# Patient Record
Sex: MALE | Race: WHITE | NOT HISPANIC OR LATINO | Employment: OTHER | URBAN - NONMETROPOLITAN AREA
[De-identification: names, ages, dates, MRNs, and addresses within clinical notes are randomized per-mention and may not be internally consistent; named-entity substitution may affect disease eponyms.]

---

## 2018-03-14 ENCOUNTER — HOSPITAL ENCOUNTER (OUTPATIENT)
Dept: MRI IMAGING | Facility: HOSPITAL | Age: 68
Discharge: HOME | End: 2018-03-14
Attending: FAMILY MEDICINE | Admitting: FAMILY MEDICINE
Payer: MEDICARE

## 2018-03-14 DIAGNOSIS — M25.462 SWELLING OF LEFT KNEE JOINT: ICD-10-CM

## 2018-03-14 PROCEDURE — 73721 MRI JNT OF LWR EXTRE W/O DYE: CPT | Mod: TC,LT

## 2020-05-05 ENCOUNTER — HOSPITAL ENCOUNTER (OUTPATIENT)
Dept: PHYSICAL THERAPY | Facility: HOSPITAL | Age: 70
Setting detail: THERAPIES SERIES
End: 2020-05-05
Attending: SPECIALIST
Payer: MEDICARE

## 2020-05-05 PROCEDURE — 97162 PT EVAL MOD COMPLEX 30 MIN: CPT | Mod: GP

## 2020-05-05 PROCEDURE — 97110 THERAPEUTIC EXERCISES: CPT | Mod: GP
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
## 2020-11-06 NOTE — PROGRESS NOTES
Physical Therapy Discharge Note      Name: Wendy Rhodes MRN# [id number]   Age: 70 year old YOB: 1950        Requesting provider: Dr. Kang ref. provider found  Diagnosis: Back strain/sprain  Prescription: Evaluate and treat  Frequency/duration: Therapists discretion  Services provided: The patient was seen for a total of 1 visit on 5/5/20.  See the previous documentation for treatment details.        Plan  Intervention:  The patient has not contacted the department since the previous visit.  For additional information regarding goals and status as of last, please refer to prior documentation.  It is uncertain if the patient has attained any further goals at this time.  The patient will be formally discharged from physical therapy care.  We will resume physical therapy services as per physician referral and discretion.     Follow up:  Recheck with physician as needed

## 2021-02-26 ENCOUNTER — HOSPITAL ENCOUNTER (OUTPATIENT)
Dept: PHYSICAL THERAPY | Facility: HOSPITAL | Age: 71
Setting detail: THERAPIES SERIES
End: 2021-02-26
Attending: FAMILY MEDICINE
Payer: MEDICARE

## 2021-02-26 PROCEDURE — 97161 PT EVAL LOW COMPLEX 20 MIN: CPT | Mod: GP

## 2021-02-26 PROCEDURE — 97012 MECHANICAL TRACTION THERAPY: CPT | Mod: GP

## 2021-02-26 NOTE — PROGRESS NOTES
Initial Physical Therapy Evaluations       Name: Wendy Rhodes MRN# [id number]   Age: 70 year old YOB: 1950     Date of Consultation: February 26, 2021  Primary care provider: Naren Barry    Referring Physician: Dr. Riggins  Orders: Eval and Treat  Medical Diagnosis: Cervical Stenosis  Onset of Illness/Injury: Spring/Summer 2020    Reason for PT Visit: Patient is a 69 y/o Male who presents with cervical complaints today. He reports numbness in his neck left arm and right hand that began in the summer of 2020. He has numbness in fingers 3-5 in his right hand, he has numbness in all of his left forearm and left hand. He also has numbness in the right side of the neck. He reports that these are exacerbated by sustained cervical extension. At first he thought this was due to his bilateral carpal tunnel which he had surgery on before. He has seen a spine surgeon who recommended a cervical fusion, he has a history of lumbar fusion. His back has not been bothering him since then. He does a lot of heavy outdoor work like cutting wood, moving his boat lift, moving his pontoon. 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 He tries to avoid overhead work when possible.   Prior Level of Function: Patient had some numbness and weakness in his hands due to bilateral carpal tunnel compression.   Pain: 0-1/10  Stiffness    Community Support/Living Environment/Employment History: Patient lives in a home with his wife and has no difficulty getting around the home.     Patient/Family Goal: He would like to minimize the numbness and tingling in his bilateral upper extremities until he is done with school and can have surgery.    Fall Screen:   Have you fallen 2 or more times in the last year? No  Have you fallen and had an injury in the last year? No  Is patient a fall risk? No    Past Medical History:   Past Medical History:   Diagnosis Date     Hypertension        Past Surgical History:  L4-L5 fusion in  2014.  Medications:   Current Outpatient Medications   Medication Sig     ATENOLOL PO Take 50 mg by mouth 2 times daily     LISINOPRIL PO Take 5 mg by mouth daily     No current facility-administered medications for this encounter.        Imaging: No recent c-spine imaging in patient's chart.    Musculoskeletal Findings:     OBJECTIVE   Observation: Patient presents to department in no acute distress.     Palpation: Patient cervical spine is diffusely tender to palpation.    Posture: Forward head, protracted shoulders   Sitting Posture: Forward head, patient sits with a flexed head when possible.   Standing Posture: Forward head, protracted shoulders and flexed head when possible.    Neurological Assessment:   Reflexes:   Biceps - 2+ bilaterally  Triceps - Absent bilaterally, patient struggled to relax his upper extremities    Myotomes:   Right upper extremity: within functional limits and symmetrical   Left upper extremity: within functional limits and symmetrical     Dermatomes:   Right upper extremity: within functional limits and symmetrical except for C7 which had reduced sensation  Left upper extremity: within functional limits and symmetrical except for C7 which had reduced sensation    Range of Motion/Strength:   Cervical Spine Range of Motion   Active Motion   Flexion 32    Extension 46    Side Bend Right 35    Side Bend Left 33    Rotation Right 70    Rotation Left 60    Right upper extremity range of motion: WNL   Left upper extremity range of motion: WNL     Right upper extremity strength: WNL except lower trapezius 4/5 and middle trapezius 4+/5  Left upper extremity strength: WNL except lower trapezius 4/5 and middle trapezius 4+/5  Deep Neck Flexor Endurance Test: 13 seconds prior to substitution    Special Tests:   Cervical Spine Tests  Spurling: R - negative L -  negative  Passive Intervertebral Movement Testing: Hypomobility throughout the cervical spine with central PA pressures  Median Nerve  Tension Test: Negative bilaterally  Radial Nerve Tension Test: Negative bilaterally  Ulnar Nerve Tension Test: Negative bilaterally       Outcome Measures:   Neck Disability Index - 18%    Prognosis/Plan of Care: Guarded, secondary to the patient's openness to physical therapy treatment for his condition.  Appropriate for Physical Therapy Intervention: Yes     GOALS:   Short-term goals:  To be achieved in 3-4 weeks:    Instruct in home program  1.) Patient will be independent with a short-term home exercise program.  2.) Patient will understand and demonstrate improved posture and techniques such that patient places less strain over cervical spine.  3.) Patient will report a 25% or greater improvement in symptoms in order to allow for increased comfort with activities of daily living.       Long-term goals:  To be achieved in 8-12 weeks:    Self management of symptoms.  Pain free activities of daily living.  1.) Improve score on Neck Disability Index by 50% to correlate with clinically significant change.  2.) Patient will report a 50% or greater improvement in symptoms in order to allow for increased comfort with activities of daily living.  3.) Patient will be able to be on the computer for extended periods of time, 30+ minutes, without exacerbating his upper extremity numbness.        Discharge goals: Patient will be independent with a home program for self-management of symptoms.      Treatment plan:  1.) Modalities including ultrasound, electrical stimulation, and mechanical traction as needed for pain management and increasing tissue extensibility  2.) Manual therapy to include cervical and thoracic spine joint and soft tissue mobilization for improved mobility and decreased pain  3.) Therapeutic exercise to consist of cervical stabilization and scapular strengthening and range of motion activities    Treatment Rendered/Intervention:   Evaluation completed as described above followed by discussion of exam  findings and plan of care.    Mechanical Traction: Mechanical intermittent cervical traction with max pull of 20 lbs for 45 seconds and min pull of 12 lbs for 15 seconds.      Clinical Impressions:  Criteria for Skilled Therapeutic Intervention Met: Yes  PT Diagnosis: Cervical stenosis  Influenced by the following impairments: Numbness and tingling in the bilateral upper extremities exacerbated by neck extension and improved by neck flexion  Functional limitations due to impairment: Patient is unable to maintain extended neck postures without exacerbating his UE numbness  Clinical presentation: Stable/Uncomplicated  Clinical presentation rationale: Patient's symptoms have not rapidly evolved since onset.  Clinical Decision making (complexity): Low Complexity  Predicted Duration of Therapy Intervention (days/wks): 1x/week for up to 12 weeks  Risks and Benefits of therapy have been explained: Yes  Patient, Family & other staff in agreement with plan of care: Yes  Comments: Patient presents today with signs and symptoms consistent of cervical stenosis. He presents with upper extremity numbness and tingling bilaterally that is exacerbated by neck extension and improved with neck flexion. Therapy today consisted of evaluation, patient education, and therapeutic exercise. Patient would benefit from continued PT sessions addressing overall pain and dysfunction with use of appropriate interventions. Treatment today consisted of cervical traction. Future sessions will include therapeutic exercise aimed at optimizing neck posture for decreased compression on neural tissues. Patient's response to treatment will be monitored and adjusted accordingly.      Total Evaluation Time: 40 minutes

## 2021-03-12 ENCOUNTER — HOSPITAL ENCOUNTER (OUTPATIENT)
Dept: PHYSICAL THERAPY | Facility: HOSPITAL | Age: 71
Setting detail: THERAPIES SERIES
End: 2021-03-12
Attending: FAMILY MEDICINE
Payer: MEDICARE

## 2021-03-12 PROCEDURE — 97012 MECHANICAL TRACTION THERAPY: CPT | Mod: GP

## 2021-03-12 NOTE — PROGRESS NOTES
Outpatient Physical Therapy Discharge Note     Patient: Wendy Rhodes  : 1950    Beginning/End Dates of Reporting Period:  2021 to 3/12/2021    Referring Provider: Dr. Riggins    Therapy Diagnosis: Cervical stenosis     Client Self Report: Patient arrives to PT today for neck rehab. He reports that he doesn't feel that traction helped much after his first appointment, he feels that everything is staying pretty consistent. He has numbness in his left hand and arm today. He reports that he's open to trying cervical traction again.    Goals:  Short-term goals:  To be achieved in 3-4 weeks:     Instruct in home program  1.) Patient will be independent with a short-term home exercise program. MET  2.) Patient will understand and demonstrate improved posture and techniques such that patient places less strain over cervical spine. MET  3.) Patient will report a 25% or greater improvement in symptoms in order to allow for increased comfort with activities of daily living. NOT MET        Long-term goals:  To be achieved in 8-12 weeks:     Self management of symptoms.  Pain free activities of daily living.  1.) Improve score on Neck Disability Index by 50% to correlate with clinically significant change. NOT MET  2.) Patient will report a 50% or greater improvement in symptoms in order to allow for increased comfort with activities of daily living. NOT MET  3.) Patient will be able to be on the computer for extended periods of time, 30+ minutes, without exacerbating his upper extremity numbness. NOT MET    Assessment:   Patient has failed to make progress with regards to his neck and radicular complaints. He has not experienced any change from his initial physical therapy visit. Patient did not experience any benefit from mechanical traction and actually experienced increase neck soreness, because of this lack of benefit he was not administered a home traction unit. Patient will discontinue physical therapy as  his condition did not respond to physical therapy management.    Plan:  Discharge from therapy.    Discharge:    Reason for Discharge: Patient chooses to discontinue therapy.  Patient has failed to make progress with conservative intervention.    Equipment Issued: None.    Discharge Plan: Patient to continue home program.

## 2021-06-13 ENCOUNTER — HOSPITAL ENCOUNTER (EMERGENCY)
Facility: HOSPITAL | Age: 71
Discharge: HOME | End: 2021-06-13
Attending: NURSE PRACTITIONER | Admitting: NURSE PRACTITIONER
Payer: MEDICARE

## 2021-06-13 ENCOUNTER — APPOINTMENT (OUTPATIENT)
Dept: GENERAL RADIOLOGY | Facility: HOSPITAL | Age: 71
End: 2021-06-13
Attending: NURSE PRACTITIONER
Payer: MEDICARE

## 2021-06-13 ENCOUNTER — APPOINTMENT (OUTPATIENT)
Dept: CT IMAGING | Facility: HOSPITAL | Age: 71
End: 2021-06-13
Attending: NURSE PRACTITIONER
Payer: MEDICARE

## 2021-06-13 VITALS
SYSTOLIC BLOOD PRESSURE: 119 MMHG | TEMPERATURE: 96.7 F | HEART RATE: 77 BPM | OXYGEN SATURATION: 97 % | WEIGHT: 200 LBS | RESPIRATION RATE: 20 BRPM | DIASTOLIC BLOOD PRESSURE: 73 MMHG

## 2021-06-13 DIAGNOSIS — M25.552 HIP PAIN, LEFT: ICD-10-CM
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
## 2021-06-13 LAB
ALBUMIN SERPL-MCNC: 3.7 G/DL (ref 3.4–5)
ALP SERPL-CCNC: 65 U/L (ref 40–150)
ALT SERPL W P-5'-P-CCNC: 23 U/L (ref 0–70)
ANION GAP SERPL CALCULATED.3IONS-SCNC: 8 MMOL/L (ref 3–14)
AST SERPL W P-5'-P-CCNC: 19 U/L (ref 0–45)
BASOPHILS # BLD AUTO: 0.1 10E9/L (ref 0–0.2)
BASOPHILS NFR BLD AUTO: 0.5 %
BILIRUB SERPL-MCNC: 0.2 MG/DL (ref 0.2–1.3)
BUN SERPL-MCNC: 20 MG/DL (ref 7–30)
CALCIUM SERPL-MCNC: 8.7 MG/DL (ref 8.5–10.1)
CHLORIDE SERPL-SCNC: 103 MMOL/L (ref 94–109)
CO2 SERPL-SCNC: 25 MMOL/L (ref 20–32)
CREAT SERPL-MCNC: 0.84 MG/DL (ref 0.66–1.25)
DIFFERENTIAL METHOD BLD: ABNORMAL
EOSINOPHIL # BLD AUTO: 0.5 10E9/L (ref 0–0.7)
EOSINOPHIL NFR BLD AUTO: 3.2 %
ERYTHROCYTE [DISTWIDTH] IN BLOOD BY AUTOMATED COUNT: 12.7 % (ref 10–15)
GFR SERPL CREATININE-BSD FRML MDRD: 89 ML/MIN/{1.73_M2}
GLUCOSE SERPL-MCNC: 147 MG/DL (ref 70–99)
HCT VFR BLD AUTO: 40.5 % (ref 40–53)
HGB BLD-MCNC: 13.7 G/DL (ref 13.3–17.7)
IMM GRANULOCYTES # BLD: 0.1 10E9/L (ref 0–0.4)
IMM GRANULOCYTES NFR BLD: 0.5 %
LYMPHOCYTES # BLD AUTO: 2.8 10E9/L (ref 0.8–5.3)
LYMPHOCYTES NFR BLD AUTO: 18.4 %
MCH RBC QN AUTO: 31.1 PG (ref 26.5–33)
MCHC RBC AUTO-ENTMCNC: 33.8 G/DL (ref 31.5–36.5)
MCV RBC AUTO: 92 FL (ref 78–100)
MONOCYTES # BLD AUTO: 1.1 10E9/L (ref 0–1.3)
MONOCYTES NFR BLD AUTO: 7.1 %
NEUTROPHILS # BLD AUTO: 10.6 10E9/L (ref 1.6–8.3)
NEUTROPHILS NFR BLD AUTO: 70.3 %
NRBC # BLD AUTO: 0 10*3/UL
NRBC BLD AUTO-RTO: 0 /100
PLATELET # BLD AUTO: 301 10E9/L (ref 150–450)
POTASSIUM SERPL-SCNC: 3.7 MMOL/L (ref 3.4–5.3)
PROT SERPL-MCNC: 6.9 G/DL (ref 6.8–8.8)
RBC # BLD AUTO: 4.41 10E12/L (ref 4.4–5.9)
SODIUM SERPL-SCNC: 136 MMOL/L (ref 133–144)
WBC # BLD AUTO: 15.1 10E9/L (ref 4–11)

## 2021-06-13 PROCEDURE — 99285 EMERGENCY DEPT VISIT HI MDM: CPT | Performed by: NURSE PRACTITIONER

## 2021-06-13 PROCEDURE — 93010 ELECTROCARDIOGRAM REPORT: CPT | Performed by: INTERNAL MEDICINE

## 2021-06-13 PROCEDURE — 73700 CT LOWER EXTREMITY W/O DYE: CPT | Mod: LT

## 2021-06-13 PROCEDURE — 250N000013 HC RX MED GY IP 250 OP 250 PS 637: Performed by: NURSE PRACTITIONER

## 2021-06-13 PROCEDURE — 85025 COMPLETE CBC W/AUTO DIFF WBC: CPT | Performed by: NURSE PRACTITIONER

## 2021-06-13 PROCEDURE — 96374 THER/PROPH/DIAG INJ IV PUSH: CPT

## 2021-06-13 PROCEDURE — 250N000011 HC RX IP 250 OP 636: Performed by: NURSE PRACTITIONER

## 2021-06-13 PROCEDURE — 80053 COMPREHEN METABOLIC PANEL: CPT | Performed by: NURSE PRACTITIONER

## 2021-06-13 PROCEDURE — 96376 TX/PRO/DX INJ SAME DRUG ADON: CPT

## 2021-06-13 PROCEDURE — 96375 TX/PRO/DX INJ NEW DRUG ADDON: CPT

## 2021-06-13 PROCEDURE — 36415 COLL VENOUS BLD VENIPUNCTURE: CPT

## 2021-06-13 PROCEDURE — 93005 ELECTROCARDIOGRAM TRACING: CPT

## 2021-06-13 PROCEDURE — 99285 EMERGENCY DEPT VISIT HI MDM: CPT | Mod: 25

## 2021-06-13 PROCEDURE — 73502 X-RAY EXAM HIP UNI 2-3 VIEWS: CPT

## 2021-06-13 RX ORDER — DIAZEPAM 10 MG/2ML
2.5 INJECTION, SOLUTION INTRAMUSCULAR; INTRAVENOUS ONCE
Status: COMPLETED | OUTPATIENT
Start: 2021-06-13 | End: 2021-06-13

## 2021-06-13 RX ORDER — HYDROMORPHONE HYDROCHLORIDE 1 MG/ML
0.5 INJECTION, SOLUTION INTRAMUSCULAR; INTRAVENOUS; SUBCUTANEOUS
Status: COMPLETED | OUTPATIENT
Start: 2021-06-13 | End: 2021-06-13

## 2021-06-13 RX ORDER — MULTIVITAMIN WITH IRON
1 TABLET ORAL DAILY
COMMUNITY

## 2021-06-13 RX ORDER — ONDANSETRON 2 MG/ML
4 INJECTION INTRAMUSCULAR; INTRAVENOUS EVERY 30 MIN PRN
Status: DISCONTINUED | OUTPATIENT
Start: 2021-06-13 | End: 2021-06-14 | Stop reason: HOSPADM

## 2021-06-13 RX ORDER — CYCLOBENZAPRINE HCL 5 MG
5-10 TABLET ORAL 3 TIMES DAILY PRN
Qty: 20 TABLET | Refills: 0 | Status: SHIPPED | OUTPATIENT
Start: 2021-06-13 | End: 2021-06-19

## 2021-06-13 RX ORDER — OXYCODONE HYDROCHLORIDE 5 MG/1
5 TABLET ORAL ONCE
Status: COMPLETED | OUTPATIENT
Start: 2021-06-13 | End: 2021-06-13

## 2021-06-13 RX ORDER — CYCLOBENZAPRINE HCL 10 MG
10 TABLET ORAL ONCE
Status: COMPLETED | OUTPATIENT
Start: 2021-06-13 | End: 2021-06-13

## 2021-06-13 RX ADMIN — ONDANSETRON 4 MG: 2 INJECTION INTRAMUSCULAR; INTRAVENOUS at 20:25

## 2021-06-13 RX ADMIN — OXYCODONE HYDROCHLORIDE 5 MG: 5 TABLET ORAL at 21:09

## 2021-06-13 RX ADMIN — HYDROMORPHONE HYDROCHLORIDE 0.5 MG: 1 INJECTION, SOLUTION INTRAMUSCULAR; INTRAVENOUS; SUBCUTANEOUS at 20:25

## 2021-06-13 RX ADMIN — CYCLOBENZAPRINE 10 MG: 10 TABLET, FILM COATED ORAL at 21:09

## 2021-06-13 RX ADMIN — ONDANSETRON 4 MG: 2 INJECTION INTRAMUSCULAR; INTRAVENOUS at 19:27

## 2021-06-13 RX ADMIN — DIAZEPAM 2.5 MG: 5 INJECTION, SOLUTION INTRAMUSCULAR; INTRAVENOUS at 19:46

## 2021-06-13 RX ADMIN — HYDROMORPHONE HYDROCHLORIDE 0.5 MG: 1 INJECTION, SOLUTION INTRAMUSCULAR; INTRAVENOUS; SUBCUTANEOUS at 19:27

## 2021-06-13 ASSESSMENT — ENCOUNTER SYMPTOMS
COLOR CHANGE: 0
DIARRHEA: 0
COUGH: 0
VOMITING: 0
JOINT SWELLING: 0
DIFFICULTY URINATING: 0
PALPITATIONS: 0
ABDOMINAL PAIN: 0
HEADACHES: 0
MYALGIAS: 0
NUMBNESS: 0
CONSTIPATION: 0
ARTHRALGIAS: 1
WOUND: 0
SHORTNESS OF BREATH: 0
DIZZINESS: 0
NECK STIFFNESS: 0
BACK PAIN: 0
NAUSEA: 0
CHILLS: 0
LIGHT-HEADEDNESS: 0
NECK PAIN: 0
FEVER: 0
WEAKNESS: 0

## 2021-06-14 NOTE — ED PROVIDER NOTES
History     Chief Complaint   Patient presents with     Fall     3-4 stairs, landed on L hip     HPI  Wendy Rhodes is a 71 year old male who presents accompanied by wife for evaluation of left hip pain.  He reports that around 3:30 PM he fell backwards down 2-3 stairs and landed directly on left hip.  After falling he was able to stand and ambulate.  Around 5:30 PM pain increased and he became unable to bear weight on left leg.  He reports feeling tightening/spasming of left leg and this increases pain.  He is not on any anticoagulation therapy.  He denies hitting his head.  He denies any neck or back pain.  He does have a history of back surgery and has upcoming cervical spine fusion later this month.  He denies any chest pain, abdominal pain.  He denies any symptoms of presyncope, headache, chest pain, dyspnea prior to fall and endorses this was purely mechanical fall.  He is not having any numbness or tingling in his legs.  He is not having any weakness in his legs but is unable to bear weight on left leg due to increasing pain when he does so.  He has not taken anything for discomfort.    Allergies:  No Known Allergies    Problem List:    There are no active problems to display for this patient.       Past Medical History:    Past Medical History:   Diagnosis Date     Hypertension        Past Surgical History:    No past surgical history on file.    Family History:    No family history on file.    Social History:  Marital Status:   [2]  Social History     Tobacco Use     Smoking status: Never Smoker   Substance Use Topics     Alcohol use: Yes     Comment: 2 drinks nightly     Drug use: No        Medications:    ATENOLOL PO  cyclobenzaprine (FLEXERIL) 5 MG tablet  LISINOPRIL PO  magnesium 250 MG tablet          Review of Systems   Constitutional: Negative for chills and fever.   HENT: Negative for nosebleeds.    Eyes: Negative for visual disturbance.   Respiratory: Negative for cough and shortness of  breath.    Cardiovascular: Negative for chest pain and palpitations.   Gastrointestinal: Negative for abdominal pain, constipation, diarrhea, nausea and vomiting.   Genitourinary: Negative for difficulty urinating.   Musculoskeletal: Positive for arthralgias (left hip). Negative for back pain, joint swelling, myalgias, neck pain and neck stiffness.   Skin: Negative for color change, rash and wound.   Neurological: Negative for dizziness, syncope, weakness, light-headedness, numbness and headaches.       Physical Exam   BP: 136/94  Pulse: 71  Temp: 98.4  F (36.9  C)  Resp: 18  Weight: 90.7 kg (200 lb)  SpO2: 99 %      Physical Exam  Constitutional:       General: He is in acute distress.   HENT:      Head: Normocephalic and atraumatic.      Right Ear: Tympanic membrane, ear canal and external ear normal.      Left Ear: Tympanic membrane, ear canal and external ear normal.      Nose: Nose normal.      Mouth/Throat:      Lips: Pink.      Mouth: Mucous membranes are moist.      Tongue: Tongue does not deviate from midline.      Pharynx: Oropharynx is clear. Uvula midline.   Eyes:      General: Lids are normal.      Extraocular Movements: Extraocular movements intact.      Conjunctiva/sclera: Conjunctivae normal.   Neck:      Musculoskeletal: No spinous process tenderness or muscular tenderness.   Cardiovascular:      Rate and Rhythm: Normal rate and regular rhythm.      Pulses:           Dorsalis pedis pulses are 2+ on the right side and 2+ on the left side.      Heart sounds: S1 normal and S2 normal. No murmur. No friction rub. No gallop.    Pulmonary:      Effort: Pulmonary effort is normal.      Breath sounds: Normal breath sounds. No wheezing or rhonchi.   Abdominal:      Palpations: Abdomen is soft.      Tenderness: There is no abdominal tenderness. There is no guarding or rebound.   Musculoskeletal:      Right hip: He exhibits normal range of motion and normal strength.      Left hip: He exhibits decreased range  of motion and tenderness.      Right knee: He exhibits normal range of motion. No tenderness found.      Left knee: He exhibits normal range of motion. No tenderness found.      Right ankle: He exhibits normal range of motion. No tenderness.      Left ankle: He exhibits normal range of motion. No tenderness.      Cervical back: He exhibits no tenderness.      Thoracic back: He exhibits no tenderness.      Lumbar back: He exhibits no tenderness.      Right lower leg: No edema.      Left lower leg: No edema.        Legs:       Comments: FROM of upper extremities without pain or decreased ROM   Skin:     General: Skin is warm and dry.      Capillary Refill: Capillary refill takes less than 2 seconds.      Findings: No ecchymosis, erythema or wound.   Neurological:      Mental Status: He is alert and oriented to person, place, and time.      Sensory: No sensory deficit (left leg/left foot).      Gait: Gait abnormal.   Psychiatric:         Mood and Affect: Mood normal.         Speech: Speech normal.         Behavior: Behavior normal. Behavior is cooperative.         ED Course     ED Course as of Jun 13 2313   Sun Jun 13, 2021   2184 Re-evaluated. Pain and spasms improved. Feels like he will be able to maneuver at home with pain control and crutches. Continues to deny neck pain, back pain.   Cristina Jarvis CNP on 6/13/2021 at 10:45 PM          Procedures               EKG Interpretation:      Interpreted by Cristina Jarvis CNP  Time reviewed: 1915  Symptoms at time of EKG: fall   Rhythm: normal sinus   Rate: normal  Axis: normal  Ectopy: none  Conduction: normal  ST Segments/ T Waves: No acute ST-T wave changes  Q Waves: none  Comparison to prior: Unchanged from 2016    Clinical Impression: normal EKG           Results for orders placed or performed during the hospital encounter of 06/13/21 (from the past 24 hour(s))   CBC with platelets differential   Result Value Ref Range    WBC 15.1 (H) 4.0 - 11.0 10e9/L    RBC  Count 4.41 4.4 - 5.9 10e12/L    Hemoglobin 13.7 13.3 - 17.7 g/dL    Hematocrit 40.5 40.0 - 53.0 %    MCV 92 78 - 100 fl    MCH 31.1 26.5 - 33.0 pg    MCHC 33.8 31.5 - 36.5 g/dL    RDW 12.7 10.0 - 15.0 %    Platelet Count 301 150 - 450 10e9/L    Diff Method Automated Method     % Neutrophils 70.3 %    % Lymphocytes 18.4 %    % Monocytes 7.1 %    % Eosinophils 3.2 %    % Basophils 0.5 %    % Immature Granulocytes 0.5 %    Nucleated RBCs 0 0 /100    Absolute Neutrophil 10.6 (H) 1.6 - 8.3 10e9/L    Absolute Lymphocytes 2.8 0.8 - 5.3 10e9/L    Absolute Monocytes 1.1 0.0 - 1.3 10e9/L    Absolute Eosinophils 0.5 0.0 - 0.7 10e9/L    Absolute Basophils 0.1 0.0 - 0.2 10e9/L    Abs Immature Granulocytes 0.1 0 - 0.4 10e9/L    Absolute Nucleated RBC 0.0    Comprehensive metabolic panel   Result Value Ref Range    Sodium 136 133 - 144 mmol/L    Potassium 3.7 3.4 - 5.3 mmol/L    Chloride 103 94 - 109 mmol/L    Carbon Dioxide 25 20 - 32 mmol/L    Anion Gap 8 3 - 14 mmol/L    Glucose 147 (H) 70 - 99 mg/dL    Urea Nitrogen 20 7 - 30 mg/dL    Creatinine 0.84 0.66 - 1.25 mg/dL    GFR Estimate 89 >60 mL/min/[1.73_m2]    GFR Estimate If Black >90 >60 mL/min/[1.73_m2]    Calcium 8.7 8.5 - 10.1 mg/dL    Bilirubin Total 0.2 0.2 - 1.3 mg/dL    Albumin 3.7 3.4 - 5.0 g/dL    Protein Total 6.9 6.8 - 8.8 g/dL    Alkaline Phosphatase 65 40 - 150 U/L    ALT 23 0 - 70 U/L    AST 19 0 - 45 U/L   XR Pelvis including Hip Left 2-3 Views    Narrative    XR PELVIS AND HIP LEFT 2 VIEWS    HISTORY: fall, pain of left hip .    COMPARISON: None.    TECHNIQUE: AP pelvis 2 views left hip.    FINDINGS:    Lumbar fusion hardware is partially visualized. There are advanced  facet degenerative changes at L5-S1. Bilateral SI joint degenerative  changes are seen. Mild to moderate hip joint osteoarthritis is  present.        Impression    IMPRESSION:     No acute fracture. Vascular calcifications are seen.      BURAK PALACIOS MD       Medications   ondansetron  (ZOFRAN) injection 4 mg (4 mg Intravenous Given 6/13/21 2025)   oxyCODONE (ROXICODONE) 5 mg 6 tablet ED starter pack 5 mg (5 mg Oral Not Given 6/13/21 2310)   HYDROmorphone (PF) (DILAUDID) injection 0.5 mg (0.5 mg Intravenous Given 6/13/21 1927)   diazepam (VALIUM) injection 2.5 mg (2.5 mg Intravenous Given 6/13/21 1946)   HYDROmorphone (PF) (DILAUDID) injection 0.5 mg (0.5 mg Intravenous Given 6/13/21 2025)   oxyCODONE (ROXICODONE) tablet 5 mg (5 mg Oral Given 6/13/21 2109)   cyclobenzaprine (FLEXERIL) tablet 10 mg (10 mg Oral Given 6/13/21 2109)       Assessments & Plan (with Medical Decision Making)     I have reviewed the nursing notes.    I have reviewed the findings, diagnosis, plan and need for follow up with the patient.  (W19.XXXA) Fall, initial encounter  (primary encounter diagnosis)  (M25.552) Hip pain, left  71-year-old male presents accompanied by wife in acute distress evaluation of left hip pain.  He had fallen about 4 hours prior to arrival and was able to get up and ambulate after fall.  Pain has progressively worsened and he is now unable to bear weight to the left leg.  No ecchymosis, erythema, open areas to left leg.  X-ray negative for acute findings.  Given extent of pain and inability to bear weight CT obtained.  This is also negative for acute findings.  He is treated in the ED with pain medication and muscle relaxants.  He has significant improvement in discomfort and is able to move around on ED stretcher and in room with tolerable pain.  Plan for discharge home with pain management, crutches and outpatient follow-up.  If no improvement in 5 to 7 days may need MRI for further evaluation.        RETURN TO THE ED WITH NEW OR WORSENING SYMPTOMS.    ED FOLLOW-UP WITH YOUR PRIMARY CARE PROVIDER IN 5-7 DAYS.      Cristina Jarvis, CNP        New Prescriptions    CYCLOBENZAPRINE (FLEXERIL) 5 MG TABLET    Take 1-2 tablets (5-10 mg) by mouth 3 times daily as needed for muscle spasms       Final  diagnoses:   Fall, initial encounter   Hip pain, left       6/13/2021   [facility]     Cristina Jarvis CNP  06/13/21 3301

## 2021-06-14 NOTE — ED NOTES
10/10 pain to left hip. Unable to bear weight. Patient states he tripped and fell backwards down 3-4 cement steps landing on left hip. Denies hitting head, no loss of consciousness, no blood thinners. Patient was able to walk around for over an hour then had sudden onset pain. There is a firm area of tissue noted under left buttock. No bruising noted. Patient is diaphoretic and crying out in pain. EKG done. Labs sent. Appears NSR per cardiac monitor. CMS intact.

## 2021-06-14 NOTE — ED NOTES
Discharge instructions reviewed and patient verbalizes understanding. Instructed on safe use of oxycodone as well as flexeril. Reviewed alternative forms of pain medication and to follow up with PCP. Instructed to return to ED with any concerns. Patient is able to stand upon discharge. Wheeled to entrance and crutches in car. Wife present to drive patient home. Pain at tolerable level.

## 2021-06-14 NOTE — ED NOTES
Discharge instructions reviewed and patient verbalizes understanding. Prescriptions sent to pharmacy. Awaiting oxycodone take home tabs.

## 2021-06-14 NOTE — DISCHARGE INSTRUCTIONS
(W19.XXXA) Fall, initial encounter  (primary encounter diagnosis)  (M25.552) Hip pain, left  71-year-old male presents accompanied by wife in acute distress evaluation of left hip pain.  He had fallen about 4 hours prior to arrival and was able to get up and ambulate after fall.  Pain has progressively worsened and he is now unable to bear weight to the left leg.  No ecchymosis, erythema, open areas to left leg.  X-ray negative for acute findings.  Given extent of pain and inability to bear weight CT obtained.  This is also negative for acute findings.  He is treated in the ED with pain medication and muscle relaxants.  He has significant improvement in discomfort and is able to move around on ED stretcher and in room with tolerable pain.  Plan for discharge home with pain management, crutches and outpatient follow-up.  If no improvement in 5 to 7 days may need MRI for further evaluation.  RecommenD:  - Manage pain with acetaminophen (Tylenol) 1,000 mg every 8 hours and ibuprofen (Advil) 800 mg with food every 8 hours. *You can alternate these every 4 hours. For example: 8 am ibuprofen, 12 pm acetaminophen, 4 pm ibuprofen, 8 pm acetaminophen, etc.*  - Ice and/or heat to left hip area  - Topical anesthetic such as biofreeze, bengay, icy hot, lidocaine  - muscle relaxant cyclobenzaprine (Flexeril) 5-10 mg three times daily  - Narcotic pain medication oxycodone 5 mg for severe pain not alleviated by above measures  Narcotics are intended for short term use of acute pain.  They are not typically indicated for long term use or treatment of chronic pain due to risks and side effects.  Avoid operating vehicle/heavy equipment while taking narcotics. Also avoid drinking alcohol and using elicit substances while taking narcotics as doing so may increase your risk of side effects including respiratory depression which can lead to death.  Common side effects of narcotics include but are not limited to: constipation, nausea,  vomiting, dizziness, sedation.         RETURN TO THE ED WITH NEW OR WORSENING SYMPTOMS.    ED FOLLOW-UP WITH YOUR PRIMARY CARE PROVIDER IN 5-7 DAYS.      Cristina Jarvis CNP    Results for orders placed or performed during the hospital encounter of 06/13/21   XR Pelvis including Hip Left 2-3 Views     Status: None    Narrative    XR PELVIS AND HIP LEFT 2 VIEWS    HISTORY: fall, pain of left hip .    COMPARISON: None.    TECHNIQUE: AP pelvis 2 views left hip.    FINDINGS:    Lumbar fusion hardware is partially visualized. There are advanced  facet degenerative changes at L5-S1. Bilateral SI joint degenerative  changes are seen. Mild to moderate hip joint osteoarthritis is  present.        Impression    IMPRESSION:     No acute fracture. Vascular calcifications are seen.      BURAK PALACIOS MD   CBC with platelets differential     Status: Abnormal   Result Value Ref Range    WBC 15.1 (H) 4.0 - 11.0 10e9/L    RBC Count 4.41 4.4 - 5.9 10e12/L    Hemoglobin 13.7 13.3 - 17.7 g/dL    Hematocrit 40.5 40.0 - 53.0 %    MCV 92 78 - 100 fl    MCH 31.1 26.5 - 33.0 pg    MCHC 33.8 31.5 - 36.5 g/dL    RDW 12.7 10.0 - 15.0 %    Platelet Count 301 150 - 450 10e9/L    Diff Method Automated Method     % Neutrophils 70.3 %    % Lymphocytes 18.4 %    % Monocytes 7.1 %    % Eosinophils 3.2 %    % Basophils 0.5 %    % Immature Granulocytes 0.5 %    Nucleated RBCs 0 0 /100    Absolute Neutrophil 10.6 (H) 1.6 - 8.3 10e9/L    Absolute Lymphocytes 2.8 0.8 - 5.3 10e9/L    Absolute Monocytes 1.1 0.0 - 1.3 10e9/L    Absolute Eosinophils 0.5 0.0 - 0.7 10e9/L    Absolute Basophils 0.1 0.0 - 0.2 10e9/L    Abs Immature Granulocytes 0.1 0 - 0.4 10e9/L    Absolute Nucleated RBC 0.0    Comprehensive metabolic panel     Status: Abnormal   Result Value Ref Range    Sodium 136 133 - 144 mmol/L    Potassium 3.7 3.4 - 5.3 mmol/L    Chloride 103 94 - 109 mmol/L    Carbon Dioxide 25 20 - 32 mmol/L    Anion Gap 8 3 - 14 mmol/L    Glucose 147 (H) 70 - 99  mg/dL    Urea Nitrogen 20 7 - 30 mg/dL    Creatinine 0.84 0.66 - 1.25 mg/dL    GFR Estimate 89 >60 mL/min/[1.73_m2]    GFR Estimate If Black >90 >60 mL/min/[1.73_m2]    Calcium 8.7 8.5 - 10.1 mg/dL    Bilirubin Total 0.2 0.2 - 1.3 mg/dL    Albumin 3.7 3.4 - 5.0 g/dL    Protein Total 6.9 6.8 - 8.8 g/dL    Alkaline Phosphatase 65 40 - 150 U/L    ALT 23 0 - 70 U/L    AST 19 0 - 45 U/L

## 2021-06-14 NOTE — ED NOTES
Pain is 5/10. Patient states he will attempt to stand at bedside. States he does have crutches in the car if needed.

## 2021-06-14 NOTE — ED NOTES
Patient was able to stand at bedside and bear some weight on the left leg. States it was painful to sit on hard chair but he feels like he would be able to get around at home okay with crutches as needed.

## 2021-08-09 ENCOUNTER — MEDICAL CORRESPONDENCE (OUTPATIENT)
Dept: HEALTH INFORMATION MANAGEMENT | Facility: HOSPITAL | Age: 71
End: 2021-08-09

## 2021-08-10 ENCOUNTER — HOSPITAL ENCOUNTER (OUTPATIENT)
Dept: GENERAL RADIOLOGY | Facility: HOSPITAL | Age: 71
Discharge: HOME | End: 2021-08-10
Attending: PHYSICIAN ASSISTANT | Admitting: PHYSICIAN ASSISTANT
Payer: MEDICARE

## 2021-08-10 DIAGNOSIS — Z98.1 ARTHRODESIS STATUS: ICD-10-CM

## 2021-08-10 PROCEDURE — 72040 X-RAY EXAM NECK SPINE 2-3 VW: CPT

## 2022-01-17 ENCOUNTER — MEDICAL CORRESPONDENCE (OUTPATIENT)
Dept: CT IMAGING | Facility: HOSPITAL | Age: 72
End: 2022-01-17
Payer: COMMERCIAL

## 2022-01-20 ENCOUNTER — HOSPITAL ENCOUNTER (OUTPATIENT)
Dept: CT IMAGING | Facility: HOSPITAL | Age: 72
End: 2022-01-20
Attending: SPECIALIST
Payer: MEDICARE

## 2022-01-20 ENCOUNTER — HOSPITAL ENCOUNTER (OUTPATIENT)
Dept: GENERAL RADIOLOGY | Facility: HOSPITAL | Age: 72
End: 2022-01-20
Attending: SPECIALIST
Payer: MEDICARE

## 2022-01-20 DIAGNOSIS — M54.2 CERVICAL PAIN: ICD-10-CM

## 2022-01-20 DIAGNOSIS — Z98.1 ARTHRODESIS STATUS: ICD-10-CM

## 2022-01-20 PROCEDURE — 72040 X-RAY EXAM NECK SPINE 2-3 VW: CPT

## 2022-01-20 PROCEDURE — 72125 CT NECK SPINE W/O DYE: CPT

## 2022-01-31 ENCOUNTER — MEDICAL CORRESPONDENCE (OUTPATIENT)
Dept: MRI IMAGING | Facility: HOSPITAL | Age: 72
End: 2022-01-31
Payer: COMMERCIAL

## 2022-02-04 ENCOUNTER — HOSPITAL ENCOUNTER (OUTPATIENT)
Dept: MRI IMAGING | Facility: HOSPITAL | Age: 72
Discharge: HOME | End: 2022-02-04
Attending: SPECIALIST | Admitting: SPECIALIST
Payer: MEDICARE

## 2022-02-04 DIAGNOSIS — Z98.1 ARTHRODESIS STATUS: ICD-10-CM

## 2022-02-04 DIAGNOSIS — G95.9 MYELOPATHY (H): ICD-10-CM

## 2022-02-04 PROCEDURE — 72141 MRI NECK SPINE W/O DYE: CPT

## 2024-10-03 ENCOUNTER — HOSPITAL ENCOUNTER (EMERGENCY)
Facility: HOSPITAL | Age: 74
Discharge: HOME | End: 2024-10-03
Attending: NURSE PRACTITIONER | Admitting: NURSE PRACTITIONER
Payer: MEDICARE

## 2024-10-03 VITALS
HEART RATE: 56 BPM | TEMPERATURE: 98 F | RESPIRATION RATE: 14 BRPM | WEIGHT: 209.2 LBS | OXYGEN SATURATION: 98 % | SYSTOLIC BLOOD PRESSURE: 210 MMHG | DIASTOLIC BLOOD PRESSURE: 112 MMHG

## 2024-10-03 DIAGNOSIS — H81.11 BENIGN PAROXYSMAL POSITIONAL VERTIGO OF RIGHT EAR: ICD-10-CM

## 2024-10-03 DIAGNOSIS — I10 ASYMPTOMATIC HYPERTENSION: ICD-10-CM

## 2024-10-03 PROBLEM — K21.9 GERD (GASTROESOPHAGEAL REFLUX DISEASE): Status: ACTIVE | Noted: 2021-06-29

## 2024-10-03 PROBLEM — M48.02 CERVICAL STENOSIS OF SPINE: Status: ACTIVE | Noted: 2021-06-29

## 2024-10-03 LAB
ALBUMIN SERPL BCG-MCNC: 4.7 G/DL (ref 3.5–5.2)
ALP SERPL-CCNC: 69 U/L (ref 40–150)
ALT SERPL W P-5'-P-CCNC: 22 U/L (ref 0–70)
ANION GAP SERPL CALCULATED.3IONS-SCNC: 14 MMOL/L (ref 7–15)
AST SERPL W P-5'-P-CCNC: 22 U/L (ref 0–45)
BASOPHILS # BLD AUTO: 0.1 10E3/UL (ref 0–0.2)
BASOPHILS NFR BLD AUTO: 1 %
BILIRUB SERPL-MCNC: 0.3 MG/DL
BUN SERPL-MCNC: 17.8 MG/DL (ref 8–23)
CALCIUM SERPL-MCNC: 9.5 MG/DL (ref 8.8–10.4)
CHLORIDE SERPL-SCNC: 99 MMOL/L (ref 98–107)
CREAT SERPL-MCNC: 0.73 MG/DL (ref 0.67–1.17)
EGFRCR SERPLBLD CKD-EPI 2021: >90 ML/MIN/1.73M2
EOSINOPHIL # BLD AUTO: 0.5 10E3/UL (ref 0–0.7)
EOSINOPHIL NFR BLD AUTO: 6 %
ERYTHROCYTE [DISTWIDTH] IN BLOOD BY AUTOMATED COUNT: 13.2 % (ref 10–15)
GLUCOSE SERPL-MCNC: 108 MG/DL (ref 70–99)
HCO3 SERPL-SCNC: 24 MMOL/L (ref 22–29)
HCT VFR BLD AUTO: 46.2 % (ref 40–53)
HGB BLD-MCNC: 15.8 G/DL (ref 13.3–17.7)
HOLD SPECIMEN: NORMAL
IMM GRANULOCYTES # BLD: 0 10E3/UL
IMM GRANULOCYTES NFR BLD: 0 %
LYMPHOCYTES # BLD AUTO: 2.7 10E3/UL (ref 0.8–5.3)
LYMPHOCYTES NFR BLD AUTO: 33 %
MAGNESIUM SERPL-MCNC: 2.1 MG/DL (ref 1.7–2.3)
MCH RBC QN AUTO: 31.7 PG (ref 26.5–33)
MCHC RBC AUTO-ENTMCNC: 34.2 G/DL (ref 31.5–36.5)
MCV RBC AUTO: 93 FL (ref 78–100)
MONOCYTES # BLD AUTO: 0.7 10E3/UL (ref 0–1.3)
MONOCYTES NFR BLD AUTO: 8 %
NEUTROPHILS # BLD AUTO: 4.3 10E3/UL (ref 1.6–8.3)
NEUTROPHILS NFR BLD AUTO: 52 %
NRBC # BLD AUTO: 0 10E3/UL
NRBC BLD AUTO-RTO: 0 /100
PHOSPHATE SERPL-MCNC: 3.2 MG/DL (ref 2.5–4.5)
PLATELET # BLD AUTO: 268 10E3/UL (ref 150–450)
POTASSIUM SERPL-SCNC: 4.4 MMOL/L (ref 3.4–5.3)
PROT SERPL-MCNC: 7.7 G/DL (ref 6.4–8.3)
RBC # BLD AUTO: 4.98 10E6/UL (ref 4.4–5.9)
SODIUM SERPL-SCNC: 137 MMOL/L (ref 135–145)
TROPONIN T SERPL HS-MCNC: 10 NG/L
WBC # BLD AUTO: 8.2 10E3/UL (ref 4–11)

## 2024-10-03 PROCEDURE — 84100 ASSAY OF PHOSPHORUS: CPT | Performed by: NURSE PRACTITIONER

## 2024-10-03 PROCEDURE — 84484 ASSAY OF TROPONIN QUANT: CPT | Performed by: NURSE PRACTITIONER

## 2024-10-03 PROCEDURE — 93010 ELECTROCARDIOGRAM REPORT: CPT | Performed by: INTERNAL MEDICINE

## 2024-10-03 PROCEDURE — 85004 AUTOMATED DIFF WBC COUNT: CPT | Performed by: NURSE PRACTITIONER

## 2024-10-03 PROCEDURE — 99284 EMERGENCY DEPT VISIT MOD MDM: CPT | Performed by: NURSE PRACTITIONER

## 2024-10-03 PROCEDURE — 83735 ASSAY OF MAGNESIUM: CPT | Performed by: NURSE PRACTITIONER

## 2024-10-03 PROCEDURE — 82310 ASSAY OF CALCIUM: CPT | Performed by: NURSE PRACTITIONER

## 2024-10-03 PROCEDURE — 85014 HEMATOCRIT: CPT | Performed by: NURSE PRACTITIONER

## 2024-10-03 PROCEDURE — 93005 ELECTROCARDIOGRAM TRACING: CPT | Performed by: NURSE PRACTITIONER

## 2024-10-03 PROCEDURE — 36415 COLL VENOUS BLD VENIPUNCTURE: CPT | Performed by: NURSE PRACTITIONER

## 2024-10-03 ASSESSMENT — ENCOUNTER SYMPTOMS
NUMBNESS: 1
ALLERGIC/IMMUNOLOGIC NEGATIVE: 1
EYES NEGATIVE: 1
CARDIOVASCULAR NEGATIVE: 1
CONSTITUTIONAL NEGATIVE: 1
DIZZINESS: 1
HEMATOLOGIC/LYMPHATIC NEGATIVE: 1
GASTROINTESTINAL NEGATIVE: 1
RESPIRATORY NEGATIVE: 1
ENDOCRINE NEGATIVE: 1
WEAKNESS: 0
PSYCHIATRIC NEGATIVE: 1
LIGHT-HEADEDNESS: 0
MUSCULOSKELETAL NEGATIVE: 1
HEADACHES: 0

## 2024-10-03 ASSESSMENT — COLUMBIA-SUICIDE SEVERITY RATING SCALE - C-SSRS
6. HAVE YOU EVER DONE ANYTHING, STARTED TO DO ANYTHING, OR PREPARED TO DO ANYTHING TO END YOUR LIFE?: NO
2. HAVE YOU ACTUALLY HAD ANY THOUGHTS OF KILLING YOURSELF IN THE PAST MONTH?: NO
1. IN THE PAST MONTH, HAVE YOU WISHED YOU WERE DEAD OR WISHED YOU COULD GO TO SLEEP AND NOT WAKE UP?: NO

## 2024-10-03 ASSESSMENT — ACTIVITIES OF DAILY LIVING (ADL)
ADLS_ACUITY_SCORE: 35
ADLS_ACUITY_SCORE: 35

## 2024-10-03 NOTE — ED TRIAGE NOTES
"\"Here for dizziness for about a week or so.  It started after getting up from working under a sink.\"        "

## 2024-10-03 NOTE — DISCHARGE INSTRUCTIONS
Modified Epley Maneuver  Canalith Repositioning Procedure     Benign Paroxysmal Positional Vertigo (BPPV) is a problem with your inner ear.  You might feel like the room is spinning around in circles.  This feeling is called vertigo.  It often occurs when you move a certain way, such as turning your head, standing up, rolling over in bed, or lying down.  Inside your inner ear are tiny pieces of calcium that help you keep your balance.  Normally they are spaced evenly in the 3 ear canals.          However, these bits of calcium can clump together in one of the canals.  This sends a message to your brain that you moved your head more than it actually moved.  The result is that you feel dizzy.    Your doctor may use a series of movements to move particles in the semicircular canal of your inner ear.  This helps relieve your dizziness.  Follow these steps: Start from a sitting position.  Lie down and extend your head over the end of the table at a   30-degree angle.  Turn your head 45-degrees to the side that causes your dizziness.  Wait 30 seconds.  Turn your head to the other side and wait another 30 seconds.  Roll over onto your side and look down at the floor.  Your head should be slightly angled.  Wait another 30 seconds.  Carefully return to a sitting position with your chin tipped down.  Wait 30 seconds before raising your head to a normal position.        2018 [facility]. All rights reserved.  This information is not intended as a substitute for professional medical care. Always follow your health care provider's instructions.        Follow-up with your primary care provider for reevaluation.  Contact your primary care provider if you have any questions or concerns.  Do not hesitate to return to the ER if any new or worsening symptoms.     Please read the attached instructions (if any).  They highlight more specific treatments and interventions for you at home.              Thank you for letting me  participate in your care and wish you a fast and uneventful recovery,    Jacques JENKINS, CNP    Do not hesitate to contact me with questions or concerns.  [email or web address]

## 2024-10-03 NOTE — ED PROVIDER NOTES
History     Chief Complaint   Patient presents with    Dizziness     HPI  Wendy Rhodes is a 74 year old individual with history of GERD, hypertension, cervical stenosis of the spine, is sent over from clinic for numbness and dizziness.  Patient states that he was working underneath the sink and got up and had dizziness.  Dizziness has continued since then.  Did have 1 period of cold sweats with some nausea after getting up quickly 3 days ago.  Patient went to clinic today and was advised to come to the ER.  No headache reported.  No visual disturbance.  Patient states has chronic numbness in the hands with left being greater than the right due to cervical spine issues which he apparently needs repeat surgery on.  Also states has been having some numbness in the feet which is not new.  No focal weakness reported.  Patient states that his blood pressure usually is good.  Last time that it was checked was about a year ago and was normal.    Allergies:  No Known Allergies    Problem List:    Patient Active Problem List    Diagnosis Date Noted    GERD (gastroesophageal reflux disease) 06/29/2021     Priority: Medium    Hypertension 06/29/2021     Priority: Medium    Cervical stenosis of spine 06/29/2021     Priority: Medium        Past Medical History:    Past Medical History:   Diagnosis Date    Hypertension        Past Surgical History:    No past surgical history on file.    Family History:    No family history on file.    Social History:  Marital Status:   [2]  Social History     Tobacco Use    Smoking status: Never   Substance Use Topics    Alcohol use: Yes     Comment: 2 drinks nightly    Drug use: No        Medications:    ATENOLOL PO  LISINOPRIL PO  magnesium 250 MG tablet          Review of Systems   Constitutional: Negative.    HENT: Negative.     Eyes: Negative.    Respiratory: Negative.     Cardiovascular: Negative.    Gastrointestinal: Negative.    Endocrine: Negative.    Genitourinary: Negative.     Musculoskeletal: Negative.    Skin: Negative.    Allergic/Immunologic: Negative.    Neurological:  Positive for dizziness and numbness (Extremities). Negative for weakness, light-headedness and headaches.   Hematological: Negative.    Psychiatric/Behavioral: Negative.         Physical Exam   BP: (!) 242/132 (left arm)  Pulse: 69  Temp: 97.8  F (36.6  C)  Resp: 18  Weight: 94.9 kg (209 lb 3.2 oz)  SpO2: 99 %      GENERAL APPEARANCE:  The patient is a 74 year old well-developed, well-nourished individual in no acute distress that appears as stated age.  EARS:  Tympanic membranes are clear.  NECK:  Supple.  Trachea is midline.  No carotid bruits present on auscultation.  LUNGS:  Breathing is easy.  Breath sounds are equal and clear bilaterally.  No wheezes, rhonchi, or rales.  HEART:  Regular rate and rhythm with normal S1 and S2.  No murmurs, gallops, or rubs.  MENTAL STATUS:   The patient was alert and oriented to person, place, time and purpose. Registration and recall intact. No difficulty with concentration.   CRANIAL NERVES:  PERRL. EOMI; no nystagmus.  Full visual fields.  Trapezius and sternocleidomastoid are full strength. Tongue was midline and protrudes midline. Uvula was midline and raises midline. Facial sensation was intact to pain and light touch at all distributions. No speech disturbance. Hearing intact to conversation and whisper.  No facial asymmetry.  MOTOR: Strength was 5/5 at upper extremities, lower extremities and trunk.  No drift.  Speed and dexterity was unremarkable. Bulk and tone were unremarkable. There was no evidence of atrophy/atrophy of intrinsic hand muscles or foot muscles.  No abnormal movements or fasciculations were observed.  SENSORY:  Sensation intact to pain and light touch at all distributions.   No neglect.  REFLEXES: There was no clonus present.  Toes down-going.  CEREBELLAR FUNCTIONING: No difficulty with finger-to-nose, finger tapping and heel-to-shin tasks. No dysmetria  or dysdiadochokinesia observed.  PSYCH:   Euthymic affect. Thought content unremarkable.    SKIN:  Warm, dry, and well perfused.  Good turgor.  No lesions, nodules, or rashes are noted.  No bruising noted.       ED Course     ED Course as of 10/03/24 1501   Thu Oct 03, 2024   1248 Labs and ECG ordered.   1250 In to see patient and history/physical completed.    1254 EKG 12-lead, tracing only  No acute findings on ECG.   1358 [place]-Hallpike's test was conducted with rotational nystagmus when head to the right.  Epley maneuver was then conducted with resolution of the dizziness.   1407 I did interview the patient.  He neurologically he looks nonfocal.  Dramatic improvement in symptoms after Epley maneuver suggests BPPV is likely etiology of his symptoms.  Thus his blood pressure, though high, does not represent hypertensive emergency.  He will continue to require primary care follow-up for ongoing control of his blood pressure. Encouraging quick discussion with Dr. Bernardo prior to discharge for BP Rx recommendations          ECG:    ECG competed at 1253 and personally reviewed at 1254 showing sinus rhythm with ventricular rate of 71 and QTc 399.  Normal axis.  T wave abnormality in inferior leads.  Abnormal ECG.  When compared to ECG from 6/13/2021, no significant changes noted.         Results for orders placed or performed during the hospital encounter of 10/03/24 (from the past 24 hour(s))   EKG 12-lead, tracing only   Result Value Ref Range    Systolic Blood Pressure  mmHg    Diastolic Blood Pressure  mmHg    Ventricular Rate 71 BPM    Atrial Rate 71 BPM    CO Interval 134 ms    QRS Duration 94 ms     ms    QTc 399 ms    P Axis 20 degrees    R AXIS -4 degrees    T Axis -5 degrees    Interpretation ECG       Sinus rhythm  Normal ECG  No previous ECGs available     CBC with platelets differential    Narrative    The following orders were created for panel order CBC with platelets differential.  Procedure                                Abnormality         Status                     ---------                               -----------         ------                     CBC with platelets and d...[[id number]]                      Final result                 Please view results for these tests on the individual orders.   Comprehensive metabolic panel   Result Value Ref Range    Sodium 137 135 - 145 mmol/L    Potassium 4.4 3.4 - 5.3 mmol/L    Carbon Dioxide (CO2) 24 22 - 29 mmol/L    Anion Gap 14 7 - 15 mmol/L    Urea Nitrogen 17.8 8.0 - 23.0 mg/dL    Creatinine 0.73 0.67 - 1.17 mg/dL    GFR Estimate >90 >60 mL/min/1.73m2    Calcium 9.5 8.8 - 10.4 mg/dL    Chloride 99 98 - 107 mmol/L    Glucose 108 (H) 70 - 99 mg/dL    Alkaline Phosphatase 69 40 - 150 U/L    AST 22 0 - 45 U/L    ALT 22 0 - 70 U/L    Protein Total 7.7 6.4 - 8.3 g/dL    Albumin 4.7 3.5 - 5.2 g/dL    Bilirubin Total 0.3 <=1.2 mg/dL   Magnesium   Result Value Ref Range    Magnesium 2.1 1.7 - 2.3 mg/dL   Troponin T, High Sensitivity   Result Value Ref Range    Troponin T, High Sensitivity 10 <=22 ng/L   Phosphorus   Result Value Ref Range    Phosphorus 3.2 2.5 - 4.5 mg/dL   CBC with platelets and differential   Result Value Ref Range    WBC Count 8.2 4.0 - 11.0 10e3/uL    RBC Count 4.98 4.40 - 5.90 10e6/uL    Hemoglobin 15.8 13.3 - 17.7 g/dL    Hematocrit 46.2 40.0 - 53.0 %    MCV 93 78 - 100 fL    MCH 31.7 26.5 - 33.0 pg    MCHC 34.2 31.5 - 36.5 g/dL    RDW 13.2 10.0 - 15.0 %    Platelet Count 268 150 - 450 10e3/uL    % Neutrophils 52 %    % Lymphocytes 33 %    % Monocytes 8 %    % Eosinophils 6 %    % Basophils 1 %    % Immature Granulocytes 0 %    NRBCs per 100 WBC 0 <1 /100    Absolute Neutrophils 4.3 1.6 - 8.3 10e3/uL    Absolute Lymphocytes 2.7 0.8 - 5.3 10e3/uL    Absolute Monocytes 0.7 0.0 - 1.3 10e3/uL    Absolute Eosinophils 0.5 0.0 - 0.7 10e3/uL    Absolute Basophils 0.1 0.0 - 0.2 10e3/uL    Absolute Immature Granulocytes 0.0 <=0.4 10e3/uL    Absolute  NRBCs 0.0 10e3/uL   [place] Draw    Narrative    The following orders were created for panel order [place] Draw.  Procedure                               Abnormality         Status                     ---------                               -----------         ------                     Extra Blue Top Tube[[id number]]                              Final result               Extra Red Top Tube[[id number]]                               Final result               Extra Heparinized Syringe[[id number]]                        Final result                 Please view results for these tests on the individual orders.   Extra Blue Top Tube   Result Value Ref Range    Hold Specimen JIC    Extra Red Top Tube   Result Value Ref Range    Hold Specimen JIC    Extra Heparinized Syringe   Result Value Ref Range    Hold Specimen JIC        Medications - No data to display    Assessments & Plan (with Medical Decision Making)     I have reviewed the nursing notes.    I have reviewed the findings, diagnosis, plan and need for follow up with the patient.    Summary:  Patient presents to the ER today dizziness.  Potential diagnosis which have been considered and evaluated include peripheral vertigo, central vertigo, intracerebral abnormality, arrhythmia, electrolyte abnormality, as well as others. Many of these have been excluded using the various modalities and assessment as noted on the chart. At the present time, the diagnosis given seems to be the most likely right-sided BPPV and asymptomatic hypertension  Upon arrival, vitals signs show blood pressure 242/132 with a pulse of 69.  Temperature 97.8  F.  Respirations 18 laxation 99% on room air.  The patient is alert and oriented.  Neurological examination normal other than dizziness that occurs with movement of the head.  Symptoms have been going on for greater than a week so no stroke code initiated.  ECG obtained showing no acute abnormalities.  Lab work obtained showing WBC of 8.2 with  hemoglobin of 15.8.  Electrolytes, renal, hepatic functions normal.  High-sensitivity troponin 10 making ACS unlikely as a symptom going on a week.  [place]-Hallpike maneuver conducted and patient did have rotational nystagmus when to the right.  Full Epley maneuver was conducted with resolution of symptoms.  Patient has BPPV and incidental asymptomatic hypertension has is feeling better now.  Patient is now asymptomatic making hypertensive emergency unlikely.  Patient has been encouraged to follow-up with primary care to continue longitudinal treatment of hypertension.         Critical Care Time: None    Impression and plan discussed with patient. Questions answered, concerns addressed, indications for urgent re-evaluation reviewed, and  given. Patient/Parent/Caregiver agree with treatment plan and have no further questions at this time.  AVS provided at discharge.    This note was created by the Dragon Voice Dictation System. Inadvertent typographical errors, due to software recognition problems, may still exist.             Discharge Medication List as of 10/3/2024  2:38 PM          Final diagnoses:   Benign paroxysmal positional vertigo of right ear   Asymptomatic hypertension       10/3/2024   [facility]       Jacques Solorzano APRN CNP  10/03/24 1505

## 2024-10-04 LAB
ATRIAL RATE - MUSE: 71 BPM
DIASTOLIC BLOOD PRESSURE - MUSE: NORMAL MMHG
INTERPRETATION ECG - MUSE: NORMAL
P AXIS - MUSE: 20 DEGREES
PR INTERVAL - MUSE: 134 MS
QRS DURATION - MUSE: 94 MS
QT - MUSE: 368 MS
QTC - MUSE: 399 MS
R AXIS - MUSE: -4 DEGREES
SYSTOLIC BLOOD PRESSURE - MUSE: NORMAL MMHG
T AXIS - MUSE: -5 DEGREES
VENTRICULAR RATE- MUSE: 71 BPM

## 2024-10-08 ENCOUNTER — HOSPITAL ENCOUNTER (EMERGENCY)
Facility: HOSPITAL | Age: 74
Discharge: HOME | End: 2024-10-08
Attending: NURSE PRACTITIONER | Admitting: NURSE PRACTITIONER
Payer: MEDICARE

## 2024-10-08 VITALS
OXYGEN SATURATION: 96 % | DIASTOLIC BLOOD PRESSURE: 136 MMHG | TEMPERATURE: 97.8 F | HEART RATE: 72 BPM | RESPIRATION RATE: 22 BRPM | SYSTOLIC BLOOD PRESSURE: 237 MMHG

## 2024-10-08 DIAGNOSIS — I10 ASYMPTOMATIC HYPERTENSION: ICD-10-CM

## 2024-10-08 PROCEDURE — 99282 EMERGENCY DEPT VISIT SF MDM: CPT | Performed by: NURSE PRACTITIONER

## 2024-10-08 PROCEDURE — 99282 EMERGENCY DEPT VISIT SF MDM: CPT

## 2024-10-08 ASSESSMENT — ENCOUNTER SYMPTOMS
HEMATOLOGIC/LYMPHATIC NEGATIVE: 1
MUSCULOSKELETAL NEGATIVE: 1
PSYCHIATRIC NEGATIVE: 1
GASTROINTESTINAL NEGATIVE: 1
ENDOCRINE NEGATIVE: 1
CARDIOVASCULAR NEGATIVE: 1
EYES NEGATIVE: 1
CONSTITUTIONAL NEGATIVE: 1
NEUROLOGICAL NEGATIVE: 1
ALLERGIC/IMMUNOLOGIC NEGATIVE: 1
RESPIRATORY NEGATIVE: 1

## 2024-10-08 ASSESSMENT — COLUMBIA-SUICIDE SEVERITY RATING SCALE - C-SSRS
1. IN THE PAST MONTH, HAVE YOU WISHED YOU WERE DEAD OR WISHED YOU COULD GO TO SLEEP AND NOT WAKE UP?: NO
6. HAVE YOU EVER DONE ANYTHING, STARTED TO DO ANYTHING, OR PREPARED TO DO ANYTHING TO END YOUR LIFE?: NO
2. HAVE YOU ACTUALLY HAD ANY THOUGHTS OF KILLING YOURSELF IN THE PAST MONTH?: NO

## 2024-10-08 ASSESSMENT — ACTIVITIES OF DAILY LIVING (ADL): ADLS_ACUITY_SCORE: 35

## 2024-10-08 NOTE — ED TRIAGE NOTES
Patient presents w/ frustration that he is still experiencing HTN after being seen 10/04/24 for the same complaint.   Unable to see his PCP until 10/11/24.  A&Ox4.   Denies any physical pain.  No changes in vision, headache.       Triage Assessment (Adult)       Row Name 10/08/24 1506          Triage Assessment    Airway WDL WDL        Respiratory WDL    Respiratory WDL WDL;rhythm/pattern;expansion/retractions     Rhythm/Pattern, Respiratory unlabored;pattern regular;depth regular;no shortness of breath reported     Expansion/Accessory Muscles/Retractions no use of accessory muscles;no retractions;expansion symmetric        Cognitive/Neuro/Behavioral WDL    Cognitive/Neuro/Behavioral WDL WDL

## 2024-10-08 NOTE — ED PROVIDER NOTES
History     Chief Complaint   Patient presents with    Hypertension     HPI  Wendy Rhodes is a 74 year old individual with history of GERD, hypertension, cervical stenosis of the spine, comes in for hypertension concerns.  Patient was seen on 10/3/2024 for vertigo.  This was fixed using Epley maneuver and patient has been asymptomatic since.  Patient has no headache, visual disturbances, chest pain, shortness of breath, paresthesias out of the ordinary, or focal weaknesses.  Patient states that he just checked his blood pressure at home and it is elevated.  Cannot get into his PCPs office till 10/11/2024.  Comes to the ER for this reason.    Allergies:  No Known Allergies    Problem List:    Patient Active Problem List    Diagnosis Date Noted    GERD (gastroesophageal reflux disease) 06/29/2021     Priority: Medium    Hypertension 06/29/2021     Priority: Medium    Cervical stenosis of spine 06/29/2021     Priority: Medium        Past Medical History:    Past Medical History:   Diagnosis Date    Hypertension        Past Surgical History:    No past surgical history on file.    Family History:    No family history on file.    Social History:  Marital Status:   [2]  Social History     Tobacco Use    Smoking status: Never   Substance Use Topics    Alcohol use: Yes     Comment: 2 drinks nightly    Drug use: No        Medications:    ATENOLOL PO  LISINOPRIL PO  magnesium 250 MG tablet          Review of Systems   Constitutional: Negative.    HENT: Negative.     Eyes: Negative.    Respiratory: Negative.     Cardiovascular: Negative.    Gastrointestinal: Negative.    Endocrine: Negative.    Genitourinary: Negative.    Musculoskeletal: Negative.    Skin: Negative.    Allergic/Immunologic: Negative.    Neurological: Negative.    Hematological: Negative.    Psychiatric/Behavioral: Negative.         Physical Exam   BP: (!) 245/127  Pulse: 76  Temp: 97.8  F (36.6  C)  Resp: 22  SpO2: 100 %      GENERAL APPEARANCE:   The patient is a 74 year old well-developed, well-nourished individual in no acute distress that appears as stated age.  LUNGS:  Breathing is easy.  Breath sounds are equal and clear bilaterally.  No wheezes, rhonchi, or rales.  HEART:  Regular rate and rhythm with normal S1 and S2.  No murmurs, gallops, or rubs.  EXTREMITIES:  No cyanosis, clubbing, or edema.     NEUROLOGIC:  No focal sensory or motor deficits are noted.  Gait is normal.  PSYCHIATRIC:  The patient is awake, alert, and oriented x4.  Recent and remote memory is intact.  Appropriate mood and affect.  Calm and cooperative with history and physical exam.      ED Course     ED Course as of 10/08/24 1617   Tue Oct 08, 2024   1550 In to see patient and history/physical completed.    1602 Contacted patient's primary care provider Dr. Bernardo.  Dr. Bernardo will change patient's medications.  Patient has appointment on 10/11/2024 for reevaluation.   1603 Patient discharged from the ER to start medication therapy through Dr. Bernardo.  Return precautions given.          No results found for this or any previous visit (from the past 24 hour(s)).    Medications - No data to display    Assessments & Plan (with Medical Decision Making)     I have reviewed the nursing notes.    I have reviewed the findings, diagnosis, plan and need for follow up with the patient.    Summary:  Patient presents to the ER today for hypertension.  Potential diagnosis which have been considered and evaluated include asymptomatic hypertension, hypertensive emergency, as well as others. Many of these have been excluded using the various modalities and assessment as noted on the chart. At the present time, the diagnosis given seems to be the most likely asymptomatic hypertension.  Upon arrival, vitals signs show blood pressure 245/127 with a pulse 76.  Temperature 97.8  F.  Respirations 22 with oxygenation 100% on room air.  The patient is alert and oriented.  Cardiac and respiratory  examination normal.  Neurological examination benign.  Patient is not having any symptoms.  Patient was seen on 10/3/2024 and was advised to follow-up with PCP for asymptomatic hypertension.  Patient is asymptomatic.  Nonfocal neuro exam.  No evidence of endorgan failure.  Based on current ACEP recommendations, no specific intervention is required and with no evidence of endorgan failure on history or exam, no specific screening tests such as ECG or BMP are required at this time.  Contacted patient's primary care provider Dr. Arie Bernardo.  As patient is having asymptomatic hypertension and has scheduled follow-up on 10/11/2024, Dr. Bernardo will give new prescription for patient to start today.      Critical Care Time: None    Impression and plan discussed with patient. Questions answered, concerns addressed, indications for urgent re-evaluation reviewed, and  given. Patient/Parent/Caregiver agree with treatment plan and have no further questions at this time.  AVS provided at discharge.    This note was created by the Dragon Voice Dictation System. Inadvertent typographical errors, due to software recognition problems, may still exist.             Discharge Medication List as of 10/8/2024  4:14 PM          Final diagnoses:   Asymptomatic hypertension       10/8/2024   [facility]       Jacques Solorzano, GREGORY CNP  10/08/24 2032

## 2024-10-08 NOTE — DISCHARGE INSTRUCTIONS
Follow-up with Dr. Bernardo as scheduled for reevaluation of your blood pressure.  Start the new medication that Dr. Bernardo prescribed today.         Follow-up with your primary care provider for reevaluation.  Contact your primary care provider if you have any questions or concerns.  Do not hesitate to return to the ER if any new or worsening symptoms.     Please read the attached instructions (if any).  They highlight more specific treatments and interventions for you at home.              Thank you for letting me participate in your care and wish you a fast and uneventful recovery,    Jacques JENKINS, CNP    Do not hesitate to contact me with questions or concerns.  [email or web address]

## 2024-11-25 ENCOUNTER — MEDICAL CORRESPONDENCE (OUTPATIENT)
Dept: MRI IMAGING | Facility: HOSPITAL | Age: 74
End: 2024-11-25

## 2024-12-04 ENCOUNTER — HOSPITAL ENCOUNTER (OUTPATIENT)
Dept: MRI IMAGING | Facility: HOSPITAL | Age: 74
Discharge: HOME | End: 2024-12-04
Attending: SPECIALIST
Payer: MEDICARE

## 2024-12-04 DIAGNOSIS — M48.062 LUMBAR STENOSIS WITH NEUROGENIC CLAUDICATION: ICD-10-CM

## 2024-12-04 DIAGNOSIS — M48.02 CERVICAL STENOSIS OF SPINE: ICD-10-CM

## 2024-12-04 PROCEDURE — 72148 MRI LUMBAR SPINE W/O DYE: CPT

## 2024-12-04 PROCEDURE — 72141 MRI NECK SPINE W/O DYE: CPT

## 2024-12-12 ENCOUNTER — THERAPY VISIT (OUTPATIENT)
Dept: PHYSICAL THERAPY | Facility: HOSPITAL | Age: 74
End: 2024-12-12
Attending: FAMILY MEDICINE
Payer: MEDICARE

## 2024-12-12 DIAGNOSIS — M54.16 LUMBAR RADICULOPATHY: Primary | ICD-10-CM

## 2024-12-12 PROCEDURE — 97162 PT EVAL MOD COMPLEX 30 MIN: CPT | Mod: GP

## 2024-12-12 PROCEDURE — 999N000104 HC STATISTIC NO CHARGE

## 2024-12-12 NOTE — PROGRESS NOTES
"PHYSICAL THERAPY EVALUATION  Type of Visit: Evaluation       Fall Risk Screen:  Fall screen completed by: PT  Have you fallen 2 or more times in the past year?: No  Have you fallen and had an injury in the past year?: Yes (Tripped on [address])  Is patient a fall risk?: No    Subjective         Presenting condition or subjective complaint: (Patient-Rptd) knumb legs \" Pt. Reports to therapy today for evaluation due to numbness in bilateral LE. Pt. Reports he underwent a lumbar fusion years ago which he believes is linked to his current episode. Pt. Reports with community mobility and daily activities he will experience numbness and heat in bilateral LE. Currently, he is able to walk blocks to a 1/4 mile before onset of sx. He is unsure if he gets significant leg weakness but does feel e loses control at times. He is negative for signs of cauda equina at current. Additionally, pt. Is reporting bilateral arm and hand numbness with cervical ROM. Also has hx. Of cervical fusion likely contributing. Goals for therapy of improving activity tolerance prior to sx. Onset  Date of onset: 11/11/24    Relevant medical history: (Patient-Rptd) Arthritis; Cold or hot arm or leg   Dates & types of surgery:      Prior diagnostic imaging/testing results: (Patient-Rptd) MRI; X-ray     Prior therapy history for the same diagnosis, illness or injury:        Prior Level of Function  Transfers: Independent  Ambulation: Independent  ADL: Independent  IADL:     Living Environment  Social support: (Patient-Rptd) With a significant other or spouse   Type of home: (Patient-Rptd) House; 1 level   Stairs to enter the home:         Ramp: (Patient-Rptd) No   Stairs inside the home: (Patient-Rptd) Yes (Patient-Rptd) 1 Is there a railing: (Patient-Rptd) No     Help at home: (Patient-Rptd) Home management tasks (cooking, cleaning); Medication and/or finances; Home and Yard maintenance tasks; Assist for driving and community activities  Equipment " owned:       Employment:      Hobbies/Interests: (Patient-Rptd) picture framing    Patient goals for therapy: (Patient-Rptd) be on my feet doing things    Pain assessment: Pain denied  Pt. Denied pain more of a fatigue/heaviness when performing daily tasks. Does have tingling in bilateral UE and LE     Objective   GAIT:   Weightbearing Status: WBAT  Assistive Device(s): None  Gait Deviations: Base of support increased  Stance time decreased  Kelly decreased  ROM:   (Degrees) Left AROM Left PROM  Right AROM Right PROM   Hip Flexion WNL WNL WNL WNL   Hip Extension       Hip Abduction WNL WNL WNL WNL   Hip Adduction WNL WNL WNL WNL   Hip Internal Rotation Min limitation  Min limitation  Min limitation  Min limitation    Hip External Rotation Min limitation  Min limitation  Min limitation  Min limitation    Knee Flexion WNL WNL WNL WNL   Knee Extension WNL WNL WNL WNL   Lumbar Side glide Min limitation  Min limitation    Lumbar Flexion Min limitation    Lumbar Extension Limited due to pain and tingling    Pain:   End feel:   PELVIC/SI SCREEN: WNL    MYOTOMES:    Left Right   T12-L3 (Hip Flexion) 4 4   L2-4 (Quads)  5 5   L4 (Ankle DF) 4 4   L5 (Great Toe Ext) 5 5   S1 (Toe Raise) 5 5     DTR S:    Left Right   C5 (Biceps) 2 2   C6 (Brachioradialis) 2 2   C7 (Triceps) 2 2   L4 (Quad) 2 2   S1 (Achilles) 2 2     CORD SIGNS: WNL  DERMATOMES:    Left Right   T12  Normal (light touch) Normal (light touch)   L1 Normal (light touch) Normal (light touch)   L2 Normal (light touch) Normal (light touch)   L3 Normal (light touch) Normal (light touch)   L4 Normal (light touch) Normal (light touch)   L5 Normal (light touch) Normal (light touch)   S1 Normal (light touch) Normal (light touch)     NEURAL TENSION: Lumbar WNL  FLEXIBILITY: Decreased upper trap L, Decreased levator L, Decreased upper trap R, Decreased levator R  Decreased adductors L, Decreased piriformis L, Decreased hip flexors L, Decreased adductors R, Decreased  piriformis R, Decreased hip flexors R  LUMBAR/HIP Special Tests:    Left Right   CK Negative  Negative    FADIR/Labrum/FRANCOIS Negative  Negative    Femoral Nerve     Arnold's     Piriformis     Quadrant Testing     SLR     Slump Positive Positive   Stork with Extension     Vicente            SPINAL SEGMENTAL CONCLUSIONS:  Hypomobile with reproduction of chief complaint.    CERVICAL SPINE EVALUATION  ROM:   (Degrees) Left AROM Right AROM    Cervical Flexion WNL numbness in arms bilateral     Cervical Extension WNL numbness in arms bilateral     Cervical Side bend 25 25    Cervical Rotation 62 62    Pain:   End Feel:     MYOTOMES:    Left Right   C1-2 (Neck Flexion)     C3 (Neck Side Bend)      C4 (Shrug) 5 5   C5 (Deltoid) 5 5   C6 (Biceps) 5 5   C7 (Triceps) 5 5   C8 (Thumb Ext) 5 5   T1 (Intrinsics) 5 5     DTR S: WNL  CORD SIGNS: WNL  DERMATOMES:    Left Right   C1 Normal (light touch) Normal (light touch)   C2 Normal (light touch) Normal (light touch)   C3 Normal (light touch) Normal (light touch)   C4 Normal (light touch) Normal (light touch)   C5 Normal (light touch) Normal (light touch)   C6 Normal (light touch) Normal (light touch)   C7 Normal (light touch) Normal (light touch)   C8 Normal (light touch) Normal (light touch)   T1 Normal (light touch) Normal (light touch)     FLEXIBILITY: Decreased upper trap L, Decreased levator L, Decreased upper trap R, Decreased levator R   SPECIAL TESTS:    Left Right   Alar Ligament    Cervical Flexion-Rotation     Cervical Rot/Lateral Flex     Compression     Distraction     Spurling s Positive Positive   Thoracic Outlet Screen (Steven, Adson)     Transverse Ligament     Vertebral Artery     Cotton Roll Test     Craniocervical Flexor Endurance Test     Mannheimer Test              Assessment & Plan   CLINICAL IMPRESSIONS  Medical Diagnosis: Stenosis lumbar with neurogenic claudication    Treatment Diagnosis: Lumbar and cervical radiculopathy with decreasd mobility and  strength   Impression/Assessment: Patient is a 74 year old male with complaints of neck and UE numbness along with pain in lumbar spine with claudication.   The following significant findings have been identified: Pain, Decreased ROM/flexibility, Decreased joint mobility, Decreased strength, Impaired muscle performance, and Decreased activity tolerance. These impairments interfere with their ability to perform self care tasks, recreational activities, household chores, household mobility, and community mobility as compared to previous level of function.     Clinical Decision Making (Complexity):  Clinical Presentation: Stable/Uncomplicated  Clinical Presentation Rationale: based on medical and personal factors listed in PT evaluation  Clinical Decision Making (Complexity): Low complexity    PLAN OF CARE  Treatment Interventions:  Modalities: Cryotherapy, Dry Needling, E-stim, Hot Pack, Ultrasound, Vasoneumatic Device  Interventions: Gait Training, Manual Therapy, Neuromuscular Re-education, Therapeutic Activity, Therapeutic Exercise, Self-Care/Home Management    Long Term Goals     PT Goal 1  Goal Identifier: LTG #1  Goal Description: Pt. to be independent with correct performance of HEP for cervical and lumbar mobiliy to begin self management of his condition.  Target Date: 02/20/25  PT Goal 2  Goal Identifier: LTG #2  Goal Description: Pt. to restore Bilateral LE strength to 5/5 in all planes to improve performance and mechanics with daily mobility.  Target Date: 02/20/25  PT Goal 3  Goal Identifier: LTG #3  Goal Description: Pt. to report 50% or greater increase in distance walked prior to Le sx. improving tolerance for community mobility.  Target Date: 02/20/25  PT Goal 4  Goal Identifier: STG #1  Goal Description: Pt. to report 50% or greater decrease in level of pain at worst.  Target Date: 01/11/25      Frequency of Treatment: 1x week  Duration of Treatment: 10 weeks    Recommended Referrals to Other  Professionals:   Education Assessment:   Learner/Method: Patient;Listening;Reading;Demonstration;Pictures/Video;No Barriers to Learning    Risks and benefits of evaluation/treatment have been explained.   Patient/Family/caregiver agrees with Plan of Care.     Evaluation Time:     PT Eval, Moderate Complexity Minutes (25231): 40       Signing Clinician: NIKOLE Flores [facility]                                                                                   OUTPATIENT PHYSICAL THERAPY      PLAN OF TREATMENT FOR OUTPATIENT REHABILITATION   Patient's Last Name, First Name, M.IEdwin  CarmeloWendy YOB: 1950   Provider's Name   [facility]   Medical Record No.  [id number]     Onset Date: 11/11/24  Start of Care Date: 12/12/24     Medical Diagnosis:  Stenosis lumbar with neurogenic claudication      PT Treatment Diagnosis:  Lumbar and cervical radiculopathy with decreasd mobility and strength Plan of Treatment  Frequency/Duration: 1x week/ 10 weeks    Certification date from 12/12/24 to 02/20/25         See note for plan of treatment details and functional goals     Lokesh Santana, PT                         I CERTIFY THE NEED FOR THESE SERVICES FURNISHED UNDER        THIS PLAN OF TREATMENT AND WHILE UNDER MY CARE .             Physician Signature               Date    X_____________________________________________________                  Referring Provider:  No ref. provider found    Initial Assessment  See Epic Evaluation- Start of Care Date: 12/12/24

## 2024-12-20 PROBLEM — G89.29 CHRONIC BILATERAL LOW BACK PAIN: Status: ACTIVE | Noted: 2024-12-20

## 2024-12-20 PROBLEM — M54.50 CHRONIC BILATERAL LOW BACK PAIN: Status: ACTIVE | Noted: 2024-12-20

## 2025-03-01 ENCOUNTER — HOSPITAL ENCOUNTER (EMERGENCY)
Facility: HOSPITAL | Age: 75
Discharge: LEFT BEFORE DISCHARGE | End: 2025-03-01
Payer: MEDICARE

## 2025-03-01 VITALS
TEMPERATURE: 97.1 F | DIASTOLIC BLOOD PRESSURE: 95 MMHG | OXYGEN SATURATION: 97 % | SYSTOLIC BLOOD PRESSURE: 191 MMHG | RESPIRATION RATE: 16 BRPM | HEART RATE: 67 BPM

## 2025-03-01 NOTE — ED TRIAGE NOTES
Patient presents with c/o left foot, great toe pain. Reports that he has split the nail about 6 months ago and it has been causing him problems since. Last night pain increased, soaked toe and he reports bleeding and draining pus. Denies any fevers or chills.

## 2025-04-25 PROBLEM — Z85.46 HISTORY OF PROSTATE CANCER: Status: ACTIVE | Noted: 2025-03-27

## 2025-04-25 PROBLEM — R97.20 ELEVATED PSA: Status: ACTIVE | Noted: 2025-04-16

## 2025-04-25 PROBLEM — K20.0 EOSINOPHILIC ESOPHAGITIS: Status: ACTIVE | Noted: 2025-04-16

## 2025-04-25 PROBLEM — R20.2 PARESTHESIA OF ARM: Status: ACTIVE | Noted: 2025-04-16

## 2025-04-25 PROBLEM — M25.541 ARTHRALGIA OF HANDS, BILATERAL: Status: ACTIVE | Noted: 2025-04-16

## 2025-04-25 PROBLEM — I16.0 ASYMPTOMATIC HYPERTENSIVE URGENCY: Status: ACTIVE | Noted: 2025-04-16

## 2025-04-25 PROBLEM — M54.12 CERVICAL RADICULOPATHY: Status: ACTIVE | Noted: 2025-04-16

## 2025-04-25 PROBLEM — E78.5 HYPERLIPIDEMIA: Status: ACTIVE | Noted: 2025-03-27

## 2025-04-25 PROBLEM — R76.8 ANA POSITIVE: Status: ACTIVE | Noted: 2025-04-16

## 2025-04-25 PROBLEM — M25.542 ARTHRALGIA OF HANDS, BILATERAL: Status: ACTIVE | Noted: 2025-04-16

## 2025-04-25 PROBLEM — C61 MALIGNANT NEOPLASM OF PROSTATE (H): Status: ACTIVE | Noted: 2025-04-16

## 2025-04-25 PROBLEM — G56.00 CARPAL TUNNEL SYNDROME: Status: ACTIVE | Noted: 2025-04-16

## 2025-04-25 RX ORDER — MULTIVIT-MIN/IRON FUM/FOLIC AC 7.5 MG-4
1 TABLET ORAL DAILY
COMMUNITY

## 2025-04-25 RX ORDER — SENNOSIDES 8.6 MG
1 CAPSULE ORAL EVERY 8 HOURS PRN
COMMUNITY

## 2025-04-25 RX ORDER — CEFADROXIL 500 MG/1
1 CAPSULE ORAL 2 TIMES DAILY
COMMUNITY
Start: 2025-03-01 | End: 2025-05-01

## 2025-04-25 RX ORDER — COVID-19 ANTIGEN TEST
440 KIT MISCELLANEOUS
COMMUNITY

## 2025-04-25 RX ORDER — CELECOXIB 200 MG/1
1 CAPSULE ORAL AT BEDTIME
COMMUNITY
Start: 2025-04-10

## 2025-04-25 RX ORDER — OMEPRAZOLE 20 MG/1
CAPSULE, DELAYED RELEASE ORAL
COMMUNITY

## 2025-04-25 RX ORDER — METHOCARBAMOL 500 MG/1
1 TABLET, FILM COATED ORAL EVERY 6 HOURS PRN
COMMUNITY
Start: 2025-03-30

## 2025-04-25 RX ORDER — AMLODIPINE BESYLATE 10 MG/1
TABLET ORAL
COMMUNITY
Start: 2025-02-26 | End: 2025-05-01

## 2025-04-25 RX ORDER — SENNOSIDES A AND B 8.6 MG/1
TABLET, FILM COATED ORAL
COMMUNITY
Start: 2025-03-30

## 2025-04-25 RX ORDER — ROSUVASTATIN CALCIUM 10 MG/1
1 TABLET, COATED ORAL DAILY
COMMUNITY
Start: 2024-12-16

## 2025-04-25 RX ORDER — OXYCODONE HYDROCHLORIDE 5 MG/1
1 TABLET ORAL EVERY 6 HOURS PRN
COMMUNITY
Start: 2025-03-30 | End: 2025-05-02

## 2025-04-25 RX ORDER — VALSARTAN AND HYDROCHLOROTHIAZIDE 160; 12.5 MG/1; MG/1
1 TABLET, FILM COATED ORAL AT BEDTIME
COMMUNITY
Start: 2025-03-05

## 2025-04-25 RX ORDER — AMLODIPINE BESYLATE 10 MG/1
1 TABLET ORAL AT BEDTIME
COMMUNITY
Start: 2025-02-26

## 2025-04-25 RX ORDER — CIPROFLOXACIN 500 MG/1
TABLET, FILM COATED ORAL
COMMUNITY
Start: 2025-02-07 | End: 2025-05-01

## 2025-04-25 RX ORDER — ACETAMINOPHEN 500 MG
1000 TABLET ORAL EVERY 6 HOURS
COMMUNITY
Start: 2025-03-30 | End: 2025-05-01

## 2025-04-25 RX ORDER — LISINOPRIL 40 MG/1
1 TABLET ORAL
COMMUNITY
Start: 2024-10-03 | End: 2025-05-01

## 2025-04-25 RX ORDER — MAGNESIUM OXIDE 400 MG/1
1 TABLET ORAL DAILY
COMMUNITY
End: 2025-05-01

## 2025-04-26 DIAGNOSIS — C61 PROSTATE CANCER (H): Primary | ICD-10-CM

## 2025-04-28 ENCOUNTER — MEDICAL CORRESPONDENCE (OUTPATIENT)
Dept: HEALTH INFORMATION MANAGEMENT | Facility: HOSPITAL | Age: 75
End: 2025-04-28

## 2025-04-29 ENCOUNTER — ANCILLARY PROCEDURE (OUTPATIENT)
Dept: GENERAL RADIOLOGY | Facility: OTHER | Age: 75
End: 2025-04-29
Attending: SPECIALIST
Payer: MEDICARE

## 2025-04-29 DIAGNOSIS — Z09 FOLLOW-UP EXAM: ICD-10-CM

## 2025-04-29 PROCEDURE — 72100 X-RAY EXAM L-S SPINE 2/3 VWS: CPT | Mod: 26 | Performed by: RADIOLOGY

## 2025-04-29 PROCEDURE — 72100 X-RAY EXAM L-S SPINE 2/3 VWS: CPT | Mod: TC

## 2025-04-30 ENCOUNTER — TELEPHONE (OUTPATIENT)
Dept: RADIATION ONCOLOGY | Facility: HOSPITAL | Age: 75
End: 2025-04-30
Payer: COMMERCIAL

## 2025-04-30 NOTE — PROGRESS NOTES
"Radiation Oncology Rooming Note    April 30, 2025 4:13 PM     Wendy Rhodes is a 75 year old male who presents for:       Chief Complaint   Patient presents with    Consult     Radiation Oncology Consultation        Initial Vitals: BP (!) 154/78 (BP Location: Right arm, Patient Position: Chair, Cuff Size: Adult Large)   Pulse 61   Temp 97.8  F (36.6  C) (Tympanic)   Resp 16   Ht 1.702 m (5' 7\")   Wt 94.3 kg (208 lb)   SpO2 98%   BMI 32.58 kg/m     Estimated body mass index is 32.58 kg/m  as calculated from the following:    Height as of this encounter: 1.702 m (5' 7\").    Weight as of this encounter: 94.3 kg (208 lb).   Body surface area is 2.11 meters squared.  Mild Pain (2) Comment: Data Unavailable       Allergies reviewed: Yes  Medications reviewed: Yes      Patient completed the following questionnaires: PHQ-9, AUA, and NCCN Distress Thermometer.       Patient was assessed using the NCCN psychosocial distress thermometer. Patient rated the score as a 0. Patient rated current stressors as NA.       Patient received folder containing the following:  [facility]iation Therapy and You booklet  radiation planning process packet  radiation therapy timeline  financial letter  vaccines during cancer treatment hand out  mental health services and providers packet  cancer rehab information handout   business card  radiation therapy business card      Financial assistance resources given to patient:  [employer] application   [employer] application      Patient given site specific instructions including:   bladder/bowel instructions      Della Gallegos RN     "

## 2025-04-30 NOTE — TELEPHONE ENCOUNTER
"[facility]  DEPARTMENT OF RADIATION ONCOLOGY  INITIAL PATIENT ASSESSMENT    Name: Wendy Rhodes MRN: [id number]   : 1950 (75 year old)  Date of Service: 2025   Referring: No ref. provider found       Diagnosis: Prostate cancer    Prior radiation therapy: None    Prior chemotherapy: None    Prior hormonal therapy:N/A      T/c to obtain health information.  Spoke to patient.  He denies financial concerns.  He denies transportation issues.  Appointment confirmed for 25.  He will bring in a copy of his HCD and current medication list.  He has no further questions or concerns.        Nutrition Assessment    Height: 5' 7\" Weight: 200# (Stated wt)   Usual Weight: 200# Weight Change: 0      Past Medical History:   Diagnosis Date    BILLY positive 2025    Arthralgia of hands, bilateral 2025    Asymptomatic hypertensive urgency 2025    Carpal tunnel syndrome 2025    Cellulitis of great toe of left foot 2025    Cervical radiculopathy 2025    Cervical stenosis of spine 2021    Chronic bilateral low back pain 2024    Congenital spondylolisthesis 2014    Elevated PSA 2025    Eosinophilic esophagitis 2025    GERD (gastroesophageal reflux disease) 2021    History of prostate cancer 2025    HNP (herniated nucleus pulposus), lumbar 2014    Hyperlipidemia 2025    Hypertension     Internal hemorrhoids without complication 2011    Laceration of foot, left 2025    Lower urinary tract symptoms (LUTS) 01/10/2025    Malignant neoplasm of prostate (H) 2025    Paresthesia of arm 2025    Spinal stenosis, lumbar region, with neurogenic claudication 2014    Thrombosed external hemorrhoid 2011    IMO Update 10/11      Vertigo 10/03/2024       1. Receiving Chemotherapy? No - 0 points  2. Weight Loss per Month (in pounds) Based on Usual Weight: 0    100# 100-120# 120-150# 150-200# greater " than 200# Pt. System   greater than 5 5 - 6 6 - 8 7 - 10 greater than 10 1 Point   greater than 7 7- 9 9 - 11 11 - 14 greater than 15 2 Points   greater than 10 10 - 12 12 - 15 15 - 20 greater than 20 3 Points       3. Eating Problems: ( 1 Point for Each Problem)       Loss of Appetite     No - 0 points    Difficulty Swallowing    No - 0 points   Nausea    No - 0 points    Early Satiety    No - 0 points   Vomiting    No - 0 points    Taste/Smell Aversions  No - 0 points    Diarrhea    No - 0 points    Esophageal Reflux   No - 0 points   Mouth Soreness/Difficulty Chewing  No - 0 points          Total: 0    4.  Automatic Referral for the Following Diagnosis or Situations: NA     Comments: no concerns    Total Score: 0 (Scores greater than or equal to 4 will receive a Dietician Consult)        Della Gallegos RN

## 2025-05-01 ENCOUNTER — DOCUMENTATION ONLY (OUTPATIENT)
Dept: RADIATION ONCOLOGY | Facility: HOSPITAL | Age: 75
End: 2025-05-01

## 2025-05-01 ENCOUNTER — TELEPHONE (OUTPATIENT)
Dept: RADIATION ONCOLOGY | Facility: HOSPITAL | Age: 75
End: 2025-05-01

## 2025-05-01 ENCOUNTER — ONCOLOGY VISIT (OUTPATIENT)
Dept: RADIATION ONCOLOGY | Facility: HOSPITAL | Age: 75
End: 2025-05-01
Attending: RADIOLOGY
Payer: MEDICARE

## 2025-05-01 VITALS
TEMPERATURE: 97.8 F | DIASTOLIC BLOOD PRESSURE: 78 MMHG | SYSTOLIC BLOOD PRESSURE: 154 MMHG | HEART RATE: 61 BPM | OXYGEN SATURATION: 98 % | HEIGHT: 67 IN | BODY MASS INDEX: 32.65 KG/M2 | RESPIRATION RATE: 16 BRPM | WEIGHT: 208 LBS

## 2025-05-01 DIAGNOSIS — C61 PROSTATE CANCER (H): ICD-10-CM

## 2025-05-01 DIAGNOSIS — C61 MALIGNANT NEOPLASM OF PROSTATE (H): Primary | ICD-10-CM

## 2025-05-01 LAB
HOLD SPECIMEN: NORMAL
PSA SERPL DL<=0.01 NG/ML-MCNC: 23.55 NG/ML (ref 0–6.5)

## 2025-05-01 PROCEDURE — 36415 COLL VENOUS BLD VENIPUNCTURE: CPT | Performed by: RADIOLOGY

## 2025-05-01 PROCEDURE — 84153 ASSAY OF PSA TOTAL: CPT | Performed by: RADIOLOGY

## 2025-05-01 PROCEDURE — G0463 HOSPITAL OUTPT CLINIC VISIT: HCPCS

## 2025-05-01 ASSESSMENT — PATIENT HEALTH QUESTIONNAIRE - PHQ9: SUM OF ALL RESPONSES TO PHQ QUESTIONS 1-9: 0

## 2025-05-01 ASSESSMENT — PAIN SCALES - GENERAL: PAINLEVEL_OUTOF10: MILD PAIN (2)

## 2025-05-01 NOTE — TELEPHONE ENCOUNTER
Telephone note    I spoke to Mr. Wendy Rhodes about PSA result on May 1, 2025, 23.55 NG/mL.  At this time, since PSA result will modify the duration of his ADT, I plan to repeat PSA when he returns for leuprolide injection.  He understood our discussion well and was appreciative of the call.

## 2025-05-01 NOTE — NURSING NOTE
Referral for SpaceOAR/fiducials, radiation oncology consult note, and demographics faxed to [facility].    Della Gallegos RN

## 2025-05-01 NOTE — PROGRESS NOTES
Radiation oncology visit note    Requesting physician: Dr. Ciro Pedersen    Diagnosis: clinical prognostic stage IIB, [id number] unfavorable intermediate risk prostatic adenocarcinoma, Bloomingdale grade group 2, 3+4 = 7, PSA of 17.24 NG/mL on January 10, 2025, status post transrectal ultrasound-guided prostatic biopsy on February 13, 2025    Performance Status: ECOG 1    Reason for Visit  treatment options regarding role of definitive radiation treatment, including stereotactic body radiation treatment with short-term ADT as an alternative to radical prostatectomy for his unfavorable intermediate risk prostate cancer    History  Mr. Wendy Rhodes was seen today at the request of Dr. Ciro Pedersen.  He is a  75-year-old gentleman who presented with elevated PSA of 10.37 NG/mL on May 31, 2022 and 8.87 NG/mL on June 24, 2022, status post transrectal ultrasound-guided prostatic biopsy on August 1, 2022.  Pathology report was negative for evidence of malignancy.      Follow-up PSA levels went up to 18.65 NG/mL on November 7, 2024 and 17.24 NG/mL on January 10, 2025.    MRI prostate on January 30, 2025 showed 55 cc prostate and 2.5 x 1.6 x 0.9 cm PI-RADS 5 lesion in the right peripheral and transition zones at the level of the base and mid gland extending from 6 to 10 o'clock position without extracapsular extension or seminal vesicle invasion, periprostatic or pelvic sidewall adenopathy, or bony/soft tissue abnormalities identified.    He underwent transrectal ultrasound-guided prostatic biopsy on February 13, 2025.  Pathology report was remarkable for prostatic adenocarcinoma, Meena grade group 2, 3+4 = 7, involving 50% of 1 of 2 cores of right mid and prostatic adenocarcinoma, Bloomingdale grade group 1, 3+3 = 6, involving less than 5% of 1 of 3 cores of right base.  Left mid core biopsy showed atypical small acinar perforation.  Right apex, left base, and left apex core biopsy was unremarkable for evidence of  malignancy.    Pylarify PET/CT study on March 17, 2025 was remarkable for focal PSMA uptake in the right lateral peripheral zone, midline transitional zone, and posterior peripheral zone, at the level of the base extending to the mid gland without evidence of PSMA avid sulaiman or distant metastatic disease.    AUA symptom score today is 7.  Nocturia is about 3 times at night.  He rates symptoms of incomplete emptying, frequency, intermittency, and weaker stream at 1 on a scale of 0-5.  He relates that he has usable erection.  Rosangela score today is 14.    On examination, he is alert, cooperative, and in no acute distress. Psychiatric: mood and affect are appropriate. Neck: Supple without mass, adenopathy, or thyromegaly. Trachea is in midline. Lymphatics: There is no palpable cervical, supraclavicular, or inguinal adenopathy. Cardiac: Regular rate and rhythm. S1, S2. Lungs: Normal breath sounds without rales or rhonchi. Abdomen: Soft, nontender, and without palpable mass lesion. Bowel sounds are active.  Rectal examination reveals normal rectal tone without palpable anal canal or rectal mass lesion on digital examination.  Prostate gland is mildly enlarged without palpable prostatic nodule.  Extremities: Without clubbing, cyanosis, swelling, or edematous changes. Musculoskeletal: Unremarkable for bony tenderness on palpation. There is no limitation in range of motion.     Data  I personally reviewed the patient's pathology reports, laboratory work and radiology studies as detailed above including the discussion of the results with the patient.     Assessment  The patient is a 75-year-old gentleman with a diagnosis of clinical prognostic stage IIB, [id number] unfavorable intermediate risk prostatic adenocarcinoma, Meena grade group 2, 3+4 = 7, PSA of 17.24 NG/mL on January 10, 2025, status post transrectal ultrasound-guided prostatic biopsy on February 13, 2025    I believe that he is a candidate for definitive  hypofractionated radiation treatment including stereotactic body radiation treatment in combination with short-term ADT for unfavorable intermediate risk prostate cancer as an alternative to radical prostatectomy.    I discussed with the patient and his wife the role of definitive radiation treatment and short-term ADT with curative intent to improve cancer control and overall survival, technical details of image guided intensity modulated/volumetric modulated arc therapy, sequence of ADT and radiation treatment, time course of hypofractionated radiation treatment including stereotactic body radiation treatment over 5 fractions, anticipated outcomes following radiation treatment, treatment alternatives of no radiation treatment, surgery, other focal treatment modalities, ADT, comfort care, as well as potential short-term and long-term radiation related adverse reaction which includes but is not limited to general fatigue, skin irritation, redness, tanning and peeling, hair loss in lower pelvis region, bowel irritation causing abdominal cramping, loose stool, diarrhea, urgency to have a bowel movement, blood in the stool, bladder irritation causing frequent urination, bladder spasm, burning with urination, blood in the urine, soft tissue fibrosis, pain with urination, narrowing/obstruction of urethra, requiring dilation or surgical correction, contracted bladder or scar formation of bladder, fistula formation, bowel obstruction/bleeding/perforation, requiring surgical correction, decreased erectile function, bone fragility which could increase risk of fracture, and secondary malignancies.    I also discussed with the patient and his wife the sequence of ADT and radiation treatment, and   LHRH agonist versus LHRH antagonist, including relugolix, and potential side effects of ADT, including loss of libido, erectile dysfunction, increased tiredness, hot flashes, increased risk of bone thinning and fracture, loss of  muscle mass, metabolic syndrome, including increased risk of high blood sugar/diabetes, increased cholesterol, weight gain, and increased risk of cardiovascular disease, potential role of baseline bone mineral density study and vitamin D/calcium supplement.    The patient felt that he understood the above discussion well, and that all questions were addressed thoroughly and to his satisfaction. The patient indicated that he decided to proceed with short-term ADT and definitive hypofractionated radiation treatment, including SBRT.  He also consented for SBRT.    Plan  He is inclined to have LHRH agonist/leuprolide for his short-term ADT.  A referral to Dr. Xavier/Dr. Pedersen will also be made for hydrogel/SpaceOAR Char and fiducial marker placement prior to the start of his SBRT.    He will be scheduled for CT based radiation planning, followed by short-term ADT and SBRT as planned.    Thank you very much for the opportunity to assist in the care of your patient. If I can be of further assistance, please do not hesitate to contact me.    This chart is completed using Dragon [employer] voice recognition. Because of the limitations of this technology, there may be some error in the transcription that are unintended despite editing on my part. If you have any questions or concerns, please do not hesitate to contact me for clarification.    Total exam time spent on the day of the visit including review of the chart/medical records, procedure/operative notes, pathology reports, laboratory work, reviewing and interpreting pertinent imaging studies and notes, obtaining a detailed history and physical exam, education, discussion/counseling regarding the above diagnosis and the radiation oncology treatment plan with the patient and documenting in epic was 95 minutes.

## 2025-05-07 ENCOUNTER — DOCUMENTATION ONLY (OUTPATIENT)
Dept: CARE COORDINATION | Facility: OTHER | Age: 75
End: 2025-05-07

## 2025-05-07 NOTE — PROGRESS NOTES
Received NCCN Distress Thermometer noting patient scored a 0/10.    The following concerns were noted:   Physical Concerns: N/A  Emotional Concerns: N/A  Social Concerns: N/A  Practical Concerns: N/A  Spiritual or Restorationist Concerns: N/A  Other Concerns: N/A    Met with patient? No    Follow-up needed: No     Plan to follow-up with patient Per discretion of care team or request of patient     Addressed/will address the following: N/A    Resources provided: Will provide as requested/needed    SW available as needed.

## 2025-05-08 ENCOUNTER — ONCOLOGY VISIT (OUTPATIENT)
Dept: RADIATION ONCOLOGY | Facility: HOSPITAL | Age: 75
End: 2025-05-08
Payer: MEDICARE

## 2025-05-08 VITALS
DIASTOLIC BLOOD PRESSURE: 82 MMHG | HEART RATE: 82 BPM | SYSTOLIC BLOOD PRESSURE: 132 MMHG | RESPIRATION RATE: 17 BRPM | TEMPERATURE: 98 F | OXYGEN SATURATION: 97 %

## 2025-05-08 DIAGNOSIS — C61 MALIGNANT NEOPLASM OF PROSTATE (H): Primary | ICD-10-CM

## 2025-05-08 LAB — PSA SERPL DL<=0.01 NG/ML-MCNC: 21.18 NG/ML (ref 0–6.5)

## 2025-05-08 PROCEDURE — 36415 COLL VENOUS BLD VENIPUNCTURE: CPT

## 2025-05-08 PROCEDURE — 84153 ASSAY OF PSA TOTAL: CPT

## 2025-05-08 ASSESSMENT — PAIN SCALES - GENERAL: PAINLEVEL_OUTOF10: NO PAIN (0)

## 2025-05-08 NOTE — PROGRESS NOTES
Malignant neoplasm of prostate ([facility])    Wendy presents for discussion of androgen deprivation therapy, with Gleasons 3+4 (7), prostatic adenocarcinoma affecting 2 of 13 cores. Most recent PSA completed on 01/10/2025 resulted at 17.24    Pathology   8/1/2022 prostate biopsy: Negative for malignancy    2/13/2025 prostate biopsy: Prostatic adenocarcinoma, [place]'s pattern 3+4 (7), involving 2 of 13 cores positive.    Imaging     1/30/2025 MR prostate with and without contrast: PI-RADS 5 lesion in the right peripheral and transitional zones.  No extracapsular extension or seminal vesicle invasion.  No MARIA DE JESUS prostatic or pelvic sidewall adenopathy.  No bony or soft tissue abnormalities identified.    3/17/2025 PET PSMA: Focal PSMA uptake in the prostate most likely represents prostate cancer.  Correlate with tissue sampling results.  No evidence of PSMA avid sulaiman or distant metastatic disease.    PSA    05/01/2025 PSA 23.55  05/08/2025  PSA  21.18     No Known Allergies     Current Outpatient Medications   Medication Sig Dispense Refill    acetaminophen (TYLENOL) 650 MG CR tablet Take 1 tablet by mouth every 8 hours as needed.      amLODIPine (NORVASC) 10 MG tablet Take 1 tablet by mouth at bedtime.      ATENOLOL PO Take 50 mg by mouth 2 times daily      celecoxib (CELEBREX) 200 MG capsule Take 1 capsule by mouth at bedtime.      KRILL OIL PO Take 500 mg by mouth daily.      magnesium 250 MG tablet Take 1 tablet by mouth daily      methocarbamol (ROBAXIN) 500 MG tablet Take 1 tablet by mouth every 6 hours as needed.      multivitamin w/minerals (MULTIVITAMINS W/MINERALS) tablet Take 1 tablet by mouth daily.      naproxen sodium 220 MG capsule Take 440 mg by mouth every 48 hours as needed. (Patient not taking: Reported on 5/1/2025)      omeprazole (PRILOSEC) 20 MG DR capsule Take 1 capsule by mouth daily before lunch.      rosuvastatin (CRESTOR) 10 MG tablet Take 1 tablet by mouth daily.      senna (SENOKOT) 8.6 MG tablet  Take 1-2 tablets by mouth twice daily as needed.      Turmeric (QC TUMERIC COMPLEX PO) Take by mouth.      valsartan-hydrochlorothiazide (DIOVAN HCT) 160-12.5 MG tablet Take 1 tablet by mouth at bedtime.       No current facility-administered medications for this visit.        Past Medical History:   Diagnosis Date    BILLY positive 04/16/2025    Arthralgia of hands, bilateral 04/16/2025    Asymptomatic hypertensive urgency 04/16/2025    Carpal tunnel syndrome 04/16/2025    Cellulitis of great toe of left foot 03/01/2025    Cervical radiculopathy 04/16/2025    Cervical stenosis of spine 06/29/2021    Chronic bilateral low back pain 12/20/2024    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/08/2014    Elevated PSA 04/16/2025    Eosinophilic esophagitis 04/16/2025    GERD (gastroesophageal reflux disease) 06/29/2021    History of prostate cancer 03/27/2025    HNP (herniated nucleus pulposus), lumbar 08/08/2014    Hyperlipidemia 03/27/2025    Hypertension     Internal hemorrhoids without complication 04/18/2011    Laceration of foot, left 03/01/2025    Lower urinary tract symptoms (LUTS) 01/10/2025    Malignant neoplasm of prostate (H) 04/16/2025    Paresthesia of arm 04/16/2025    Spinal stenosis, lumbar region, with neurogenic claudication 08/08/2014    Thrombosed external hemorrhoid 04/18/2011    IMO Update 10/11      Vertigo 10/03/2024        Physical Exam  Constitutional:       Appearance: Normal appearance.   HENT:      Head: Normocephalic and atraumatic.   Pulmonary:      Effort: Pulmonary effort is normal.   Abdominal:      General: Abdomen is flat.   Skin:     General: Skin is warm and dry.   Neurological:      General: No focal deficit present.      Mental Status: He is alert.   Psychiatric:         Mood and Affect: Mood normal.         Behavior: Behavior normal.         Thought Content: Thought content normal.         Judgment: Judgment normal.        Education provided     Discussed androgen deprivation therapy (ADT), the  goal of stopping the production of certain hormones, blocking hormone receptors, with the goal to essential starve the cancer cells. This is often given prior or concurrently with radiation therapy to improve treatment outcomes. Common side effects discussed may include but are not limited to: fatigue, headaches, hot flashes, loss of libido, and impotence. Less common side effects may include: bone pain, injection site discomfort, increase in cholesterol levels, lower extremity swelling, generalized weakness, gynecomastia, mood changes, and sweating. Explained within the initial two weeks of injection, a person may experience an increase in testosterone serum levels which may result in swelling around the tumor or bone pain. Seek medical attention with inability to urinate, new or worsening symptoms. Printed hand out provided to patient.     Assessment/ Plan   Wendy is a healthy 75 year old male patient who present for discussion of ADT with group grade 2, gleasons 3+4 (7) prostatic adenocarcinoma, affecting 2 of 13 cores. PSA on 11/07/24 was 18.65, on 01/10/25 was 17.24. Per NCCN guidelines this classifies him in a favorable intermediate risk category. However on 05/01/2025 he had a repeat PSA which resulted as 23.55 and one week later on 05/08/2025, PSA decreased to 21.18. He denies any prostate manipulation such as sexual activity, bike riding, exercise, or other factors that may have disturbed the prostate. With the PSA now greater than 20, per NCCN guidelines he technically is in a high risk category. We discussed standard of care includes XBRT with 18-36 months of ADT, in which I would recommend 18 months in his case, as his other findings are rather favorable. For reassurance we discussed repeat PSA in two weeks, I will order the ADT to prevent a delay in care, with the instructions that he will not receive ADT until 2 week PSA has been drawn and discussed. Patient and wife report understanding and deny  further questions or concerns.  In addition I plan to discuss his case at our multidisciplinary tumor conference for additional input.     PSA redraw in 2 weeks  Order placed for Lupron 22.5 mg, q.3 months for 18 months (discussion as above)  Follow up prior to each injection for symptom management, PSA and testosterone check     Discussed Bone Density Risk if starting on long term ADT   -Calcium 1000 international units daily  -Vitamin D supplementation 1000 international units daily   -Dexa scan      35 minutes spent face to face with the patient obtaining a health history, discussion of treatment options, counseling, and 10 minutes on medical record review and documentation for a total of 45 on this date of service.     This encounter was dictated with the use of voice recognition software, any errors are unintentional.

## 2025-05-20 ENCOUNTER — LAB (OUTPATIENT)
Dept: LAB | Facility: OTHER | Age: 75
End: 2025-05-20
Payer: MEDICARE

## 2025-05-20 DIAGNOSIS — C61 PROSTATE CANCER (H): Primary | ICD-10-CM

## 2025-05-20 DIAGNOSIS — C61 MALIGNANT NEOPLASM OF PROSTATE (H): ICD-10-CM

## 2025-05-20 LAB — PSA SERPL DL<=0.01 NG/ML-MCNC: 20.65 NG/ML (ref 0–6.5)

## 2025-05-20 PROCEDURE — 36415 COLL VENOUS BLD VENIPUNCTURE: CPT | Mod: ZL

## 2025-05-20 PROCEDURE — 84153 ASSAY OF PSA TOTAL: CPT | Mod: ZL

## 2025-05-20 NOTE — PROGRESS NOTES
Reviewed PSA result with patient. PSA has decreased to 20.65 mg/ml. He does have a history of BPH which will be considered when factoring in prostate cancer risk factors prior to making a decision regarding ADT. Discussed case with medical oncologist Dr. Berger and radiation oncologist Dr. Velasquez whom also agree to repeat PSA in 3 weeks. Patient updated and is agreeable with plan of care.     Pricilla Stafford DNP, APRN, FNP-C   Department of Radiation Oncology

## 2025-06-02 ENCOUNTER — DOCUMENTATION ONLY (OUTPATIENT)
Dept: RADIATION ONCOLOGY | Facility: HOSPITAL | Age: 75
End: 2025-06-02

## 2025-06-02 NOTE — NURSING NOTE
Received a message from Pb at [facility], that patient has decided to have treatment at [place].      Della Gallegos RN

## 2025-06-06 ENCOUNTER — HOSPITAL ENCOUNTER (EMERGENCY)
Facility: HOSPITAL | Age: 75
Discharge: HOME | End: 2025-06-06
Payer: MEDICARE

## 2025-06-06 VITALS
SYSTOLIC BLOOD PRESSURE: 161 MMHG | HEART RATE: 82 BPM | DIASTOLIC BLOOD PRESSURE: 81 MMHG | TEMPERATURE: 97.7 F | RESPIRATION RATE: 18 BRPM | OXYGEN SATURATION: 98 %
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
## 2025-06-06 DIAGNOSIS — S80.262A TICK BITE OF LEFT KNEE, INITIAL ENCOUNTER: ICD-10-CM

## 2025-06-06 PROCEDURE — 99213 OFFICE O/P EST LOW 20 MIN: CPT

## 2025-06-06 PROCEDURE — 250N000013 HC RX MED GY IP 250 OP 250 PS 637

## 2025-06-06 PROCEDURE — G0463 HOSPITAL OUTPT CLINIC VISIT: HCPCS

## 2025-06-06 RX ORDER — DOXYCYCLINE 100 MG/1
200 CAPSULE ORAL ONCE
Status: COMPLETED | OUTPATIENT
Start: 2025-06-06 | End: 2025-06-06

## 2025-06-06 RX ADMIN — DOXYCYCLINE HYCLATE 200 MG: 100 CAPSULE ORAL at 12:54

## 2025-06-06 ASSESSMENT — ENCOUNTER SYMPTOMS
HEADACHES: 0
APPETITE CHANGE: 0
ACTIVITY CHANGE: 0
WOUND: 1
ARTHRALGIAS: 0
FEVER: 0
COLOR CHANGE: 0

## 2025-06-06 ASSESSMENT — COLUMBIA-SUICIDE SEVERITY RATING SCALE - C-SSRS
2. HAVE YOU ACTUALLY HAD ANY THOUGHTS OF KILLING YOURSELF IN THE PAST MONTH?: NO
1. IN THE PAST MONTH, HAVE YOU WISHED YOU WERE DEAD OR WISHED YOU COULD GO TO SLEEP AND NOT WAKE UP?: NO
6. HAVE YOU EVER DONE ANYTHING, STARTED TO DO ANYTHING, OR PREPARED TO DO ANYTHING TO END YOUR LIFE?: NO

## 2025-06-06 NOTE — DISCHARGE INSTRUCTIONS
Watch for signs of tick borne illness such as rash, fever, headache, fatigue, or any other new/concerning symptoms. You should be evaluated if you develop these.     Keep area of bite clean and dry. You can apply topical antibiotic ointment for the next 2-3 days.     Follow up in the clinic as needed.

## 2025-06-06 NOTE — ED PROVIDER NOTES
History     Chief Complaint   Patient presents with     Insect Bite     HPI  Wendy Rhodes is a 75 year old male who presents to the urgent care with complaints of an insect bite to left knee. Unsure how long tick was attached. Able to remove body, thinks head is still embedded in skin. He denies rash, fever, and feeling unwell.     Allergies:  No Known Allergies    Problem List:    Patient Active Problem List    Diagnosis Date Noted     Malignant neoplasm of prostate (H) 04/16/2025     Priority: Medium     Asymptomatic hypertensive urgency 04/16/2025     Priority: Medium     Arthralgia of hands, bilateral 04/16/2025     Priority: Medium     Paresthesia of arm 04/16/2025     Priority: Medium     BILLY positive 04/16/2025     Priority: Medium     Eosinophilic esophagitis 04/16/2025     Priority: Medium     Elevated PSA 04/16/2025     Priority: Medium     Cervical radiculopathy 04/16/2025     Priority: Medium     Carpal tunnel syndrome 04/16/2025     Priority: Medium     History of prostate cancer 03/27/2025     Priority: Medium     Hyperlipidemia 03/27/2025     Priority: Medium     Chronic bilateral low back pain 12/20/2024     Priority: Medium     GERD (gastroesophageal reflux disease) 06/29/2021     Priority: Medium     Hypertension 06/29/2021     Priority: Medium     Cervical stenosis of spine 06/29/2021     Priority: Medium     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/08/2014     Priority: Medium     Spinal stenosis, lumbar region, with neurogenic claudication 08/08/2014     Priority: Medium     HNP (herniated nucleus pulposus), lumbar 08/08/2014     Priority: Medium     Thrombosed external hemorrhoid 04/18/2011     Priority: Medium     IMO Update 10/11       Internal hemorrhoids without complication 04/18/2011     Priority: Medium        Past Medical History:    Past Medical History:   Diagnosis Date     BILLY positive 04/16/2025     Arthralgia of hands, bilateral 04/16/2025     Asymptomatic hypertensive urgency  2025     Carpal tunnel syndrome 2025     Cellulitis of great toe of left foot 2025     Cervical radiculopathy 2025     Cervical stenosis of spine 2021     Chronic bilateral low back pain 2024     Congenital spondylolisthesis 2014     Elevated PSA 2025     Eosinophilic esophagitis 2025     GERD (gastroesophageal reflux disease) 2021     History of prostate cancer 2025     HNP (herniated nucleus pulposus), lumbar 2014     Hyperlipidemia 2025     Hypertension      Internal hemorrhoids without complication 2011     Laceration of foot, left 2025     Lower urinary tract symptoms (LUTS) 01/10/2025     Malignant neoplasm of prostate (H) 2025     Paresthesia of arm 2025     Spinal stenosis, lumbar region, with neurogenic claudication 2014     Thrombosed external hemorrhoid 2011     Vertigo 10/03/2024       Past Surgical History:    Past Surgical History:   Procedure Laterality Date     BACK SURGERY N/A 2025    fusion L2-L3     COLONOSCOPY  2012     EGD  2016     History of left knee surgery  1980     SPINE SURGERY  2014    L4-5-Cage and screws     TOE SURGERY  1963    Right foot second toe-d/t  accident-age 13       Family History:    Family History   Problem Relation Age of Onset     Pancreatic Cancer Father      Stomach Cancer Paternal Grandfather        Social History:  Marital Status:   [2]  Social History     Tobacco Use     Smoking status: Former     Current packs/day: 0.00     Average packs/day: 1 pack/day for 10.0 years (10.0 ttl pk-yrs)     Types: Cigarettes     Start date:      Quit date:      Years since quittin.4     Smokeless tobacco: Never   Vaping Use     Vaping status: Never Used   Substance Use Topics     Alcohol use: Never     Comment: 2 drinks nightly     Drug use: No        Medications:    acetaminophen (TYLENOL) 650 MG CR  tablet  amLODIPine (NORVASC) 10 MG tablet  ATENOLOL PO  celecoxib (CELEBREX) 200 MG capsule  KRILL OIL PO  magnesium 250 MG tablet  methocarbamol (ROBAXIN) 500 MG tablet  multivitamin w/minerals (MULTIVITAMINS W/MINERALS) tablet  naproxen sodium 220 MG capsule  omeprazole (PRILOSEC) 20 MG DR capsule  rosuvastatin (CRESTOR) 10 MG tablet  senna (SENOKOT) 8.6 MG tablet  Turmeric (QC TUMERIC COMPLEX PO)  valsartan-hydrochlorothiazide (DIOVAN HCT) 160-12.5 MG tablet          Review of Systems   Constitutional:  Negative for activity change, appetite change and fever.   Musculoskeletal:  Negative for arthralgias.   Skin:  Positive for wound. Negative for color change and rash.   Neurological:  Negative for headaches.   All other systems reviewed and are negative.      Physical Exam   BP: (!) 161/81  Pulse: 82  Temp: 97.7  F (36.5  C)  Resp: 18  SpO2: 98 %      Physical Exam  Vitals and nursing note reviewed.   Constitutional:       General: He is not in acute distress.     Appearance: Normal appearance. He is not ill-appearing or toxic-appearing.   Musculoskeletal:         General: No swelling.   Skin:     General: Skin is warm.      Capillary Refill: Capillary refill takes less than 2 seconds.      Findings: No erythema.          Neurological:      General: No focal deficit present.      Mental Status: He is alert and oriented to person, place, and time.   Psychiatric:         Mood and Affect: Mood normal.         Behavior: Behavior normal.         Thought Content: Thought content normal.         Judgment: Judgment normal.       ED Course        Procedures       No results found for this or any previous visit (from the past 24 hours).    Medications   doxycycline hyclate (VIBRAMYCIN) capsule 200 mg (has no administration in time range)       Assessments & Plan (with Medical Decision Making)     I have reviewed the nursing notes.    I have reviewed the findings, diagnosis, plan and need for follow up with the  patient.  Wendy Rhodes is a 75 year old male who presents to the urgent care with complaints of an insect bite to left knee. Unsure how long tick was attached. Able to remove body, thinks head is still embedded in skin. He denies rash, fever, and feeling unwell.     MDM: afebrile. Non toxic in appearance with no noted distress. Tick bite to left medial knee. No redness or drainage. No fluctuance. Able to remove head. Triple antibiotic applied. One time prophylactic dose of doxycycline given in the UC. Supportive measures and return precautions discussed. He is in agreement with plan.     (S80.262A,  W57.XXXA) Tick bite of left knee, initial encounter  Plan: Watch for signs of tick borne illness such as rash, fever, headache, fatigue, or any other new/concerning symptoms. You should be evaluated if you develop these.     Keep area of bite clean and dry. You can apply topical antibiotic ointment for the next 2-3 days.     Follow up in the clinic as needed. Understanding verbalized.     New Prescriptions    No medications on file       Final diagnoses:   Tick bite of left knee, initial encounter       6/6/2025   [facility]       Raissa Toussaint NP  06/06/25 [id number]